# Patient Record
Sex: MALE | Race: ASIAN | NOT HISPANIC OR LATINO
[De-identification: names, ages, dates, MRNs, and addresses within clinical notes are randomized per-mention and may not be internally consistent; named-entity substitution may affect disease eponyms.]

---

## 2018-01-05 ENCOUNTER — APPOINTMENT (OUTPATIENT)
Dept: FAMILY MEDICINE | Facility: CLINIC | Age: 43
End: 2018-01-05
Payer: COMMERCIAL

## 2018-01-05 ENCOUNTER — LABORATORY RESULT (OUTPATIENT)
Age: 43
End: 2018-01-05

## 2018-01-05 VITALS
BODY MASS INDEX: 24.8 KG/M2 | WEIGHT: 140 LBS | RESPIRATION RATE: 12 BRPM | TEMPERATURE: 98.2 F | SYSTOLIC BLOOD PRESSURE: 110 MMHG | DIASTOLIC BLOOD PRESSURE: 78 MMHG | OXYGEN SATURATION: 96 % | HEIGHT: 63 IN | HEART RATE: 83 BPM

## 2018-01-05 DIAGNOSIS — E78.5 HYPERLIPIDEMIA, UNSPECIFIED: ICD-10-CM

## 2018-01-05 DIAGNOSIS — J32.9 CHRONIC SINUSITIS, UNSPECIFIED: ICD-10-CM

## 2018-01-05 DIAGNOSIS — Z87.09 PERSONAL HISTORY OF OTHER DISEASES OF THE RESPIRATORY SYSTEM: ICD-10-CM

## 2018-01-05 DIAGNOSIS — R50.9 FEVER, UNSPECIFIED: ICD-10-CM

## 2018-01-05 DIAGNOSIS — M79.1 MYALGIA: ICD-10-CM

## 2018-01-05 DIAGNOSIS — Z98.890 OTHER SPECIFIED POSTPROCEDURAL STATES: ICD-10-CM

## 2018-01-05 DIAGNOSIS — E55.9 VITAMIN D DEFICIENCY, UNSPECIFIED: ICD-10-CM

## 2018-01-05 PROCEDURE — 87804 INFLUENZA ASSAY W/OPTIC: CPT | Mod: QW

## 2018-01-05 PROCEDURE — 99203 OFFICE O/P NEW LOW 30 MIN: CPT | Mod: 25

## 2018-01-05 RX ORDER — LOSARTAN POTASSIUM 50 MG/1
50 TABLET, FILM COATED ORAL
Qty: 30 | Refills: 0 | Status: COMPLETED | COMMUNITY
Start: 2017-06-29

## 2018-01-05 RX ORDER — ATORVASTATIN CALCIUM 10 MG/1
10 TABLET, FILM COATED ORAL
Refills: 0 | Status: COMPLETED | COMMUNITY

## 2018-01-05 RX ORDER — MYCOPHENOLIC ACID 360 MG/1
360 TABLET, DELAYED RELEASE ORAL
Refills: 0 | Status: COMPLETED | COMMUNITY

## 2018-01-05 RX ORDER — CHROMIUM 200 MCG
1000 TABLET ORAL DAILY
Refills: 0 | Status: ACTIVE | COMMUNITY

## 2018-01-05 RX ORDER — LEVOTHYROXINE SODIUM 0.07 MG/1
75 TABLET ORAL
Qty: 30 | Refills: 0 | Status: COMPLETED | COMMUNITY
Start: 2017-06-29

## 2018-01-05 RX ORDER — PREDNISONE 5 MG/1
5 TABLET ORAL
Refills: 0 | Status: COMPLETED | COMMUNITY

## 2018-01-05 RX ORDER — TACROLIMUS 1 MG/1
1 CAPSULE ORAL
Qty: 120 | Refills: 0 | Status: COMPLETED | COMMUNITY
Start: 2017-06-29

## 2018-01-05 RX ORDER — MYCOPHENILIC ACID 360 MG/1
360 TABLET, DELAYED RELEASE ORAL
Qty: 60 | Refills: 0 | Status: COMPLETED | COMMUNITY
Start: 2017-06-29

## 2018-01-05 RX ORDER — DIBASIC SODIUM PHOSPHATE, MONOBASIC POTASSIUM PHOSPHATE AND MONOBASIC SODIUM PHOSPHATE 852; 155; 130 MG/1; MG/1; MG/1
155-852-130 TABLET ORAL
Qty: 180 | Refills: 0 | Status: COMPLETED | COMMUNITY
Start: 2017-06-29

## 2018-01-06 LAB
ALBUMIN SERPL ELPH-MCNC: 4.5 G/DL
ALP BLD-CCNC: 65 U/L
ALT SERPL-CCNC: 52 U/L
ANION GAP SERPL CALC-SCNC: 12 MMOL/L
AST SERPL-CCNC: 34 U/L
BASOPHILS # BLD AUTO: 0.03 K/UL
BASOPHILS NFR BLD AUTO: 1 %
BILIRUB SERPL-MCNC: 1 MG/DL
BUN SERPL-MCNC: 24 MG/DL
CALCIUM SERPL-MCNC: 10.9 MG/DL
CHLORIDE SERPL-SCNC: 106 MMOL/L
CO2 SERPL-SCNC: 22 MMOL/L
CREAT SERPL-MCNC: 1.17 MG/DL
EOSINOPHIL # BLD AUTO: 0.06 K/UL
EOSINOPHIL NFR BLD AUTO: 2 %
FLUAV SPEC QL CULT: NORMAL
FLUBV AG SPEC QL IA: NORMAL
GLUCOSE SERPL-MCNC: 98 MG/DL
HCT VFR BLD CALC: 48.7 %
HGB BLD-MCNC: 15.5 G/DL
IMM GRANULOCYTES NFR BLD AUTO: 6.8 %
LYMPHOCYTES # BLD AUTO: 0.35 K/UL
LYMPHOCYTES NFR BLD AUTO: 11.8 %
MAN DIFF?: NORMAL
MCHC RBC-ENTMCNC: 29.9 PG
MCHC RBC-ENTMCNC: 31.8 GM/DL
MCV RBC AUTO: 94 FL
MONOCYTES # BLD AUTO: 0.24 K/UL
MONOCYTES NFR BLD AUTO: 8.1 %
NEUTROPHILS # BLD AUTO: 2.08 K/UL
NEUTROPHILS NFR BLD AUTO: 70.3 %
PLATELET # BLD AUTO: 180 K/UL
POTASSIUM SERPL-SCNC: 5.3 MMOL/L
PROT SERPL-MCNC: 7.2 G/DL
RBC # BLD: 5.18 M/UL
RBC # FLD: 13.3 %
SODIUM SERPL-SCNC: 140 MMOL/L
WBC # FLD AUTO: 2.96 K/UL

## 2018-01-09 ENCOUNTER — OUTPATIENT (OUTPATIENT)
Dept: OUTPATIENT SERVICES | Facility: HOSPITAL | Age: 43
LOS: 1 days | Discharge: HOME | End: 2018-01-09

## 2018-01-09 DIAGNOSIS — I15.8 OTHER SECONDARY HYPERTENSION: ICD-10-CM

## 2018-01-09 DIAGNOSIS — R80.9 PROTEINURIA, UNSPECIFIED: ICD-10-CM

## 2018-01-09 DIAGNOSIS — N25.81 SECONDARY HYPERPARATHYROIDISM OF RENAL ORIGIN: ICD-10-CM

## 2018-01-09 DIAGNOSIS — Z94.0 KIDNEY TRANSPLANT STATUS: ICD-10-CM

## 2018-01-09 DIAGNOSIS — Z98.85 TRANSPLANTED ORGAN REMOVAL STATUS: ICD-10-CM

## 2018-01-09 DIAGNOSIS — E78.2 MIXED HYPERLIPIDEMIA: ICD-10-CM

## 2018-04-20 ENCOUNTER — OUTPATIENT (OUTPATIENT)
Dept: OUTPATIENT SERVICES | Facility: HOSPITAL | Age: 43
LOS: 1 days | Discharge: HOME | End: 2018-04-20

## 2018-04-20 DIAGNOSIS — N18.2 CHRONIC KIDNEY DISEASE, STAGE 2 (MILD): ICD-10-CM

## 2018-04-20 DIAGNOSIS — E55.9 VITAMIN D DEFICIENCY, UNSPECIFIED: ICD-10-CM

## 2018-04-20 DIAGNOSIS — N25.81 SECONDARY HYPERPARATHYROIDISM OF RENAL ORIGIN: ICD-10-CM

## 2018-04-20 DIAGNOSIS — Z94.0 KIDNEY TRANSPLANT STATUS: ICD-10-CM

## 2018-04-20 DIAGNOSIS — E03.9 HYPOTHYROIDISM, UNSPECIFIED: ICD-10-CM

## 2018-04-23 ENCOUNTER — EMERGENCY (EMERGENCY)
Facility: HOSPITAL | Age: 43
LOS: 0 days | Discharge: HOME | End: 2018-04-23
Attending: EMERGENCY MEDICINE | Admitting: EMERGENCY MEDICINE
Payer: OTHER MISCELLANEOUS

## 2018-04-23 VITALS
DIASTOLIC BLOOD PRESSURE: 98 MMHG | HEART RATE: 101 BPM | RESPIRATION RATE: 18 BRPM | OXYGEN SATURATION: 98 % | SYSTOLIC BLOOD PRESSURE: 142 MMHG | TEMPERATURE: 98 F

## 2018-04-23 VITALS — WEIGHT: 149.91 LBS | HEIGHT: 64 IN

## 2018-04-23 DIAGNOSIS — S61.231A PUNCTURE WOUND WITHOUT FOREIGN BODY OF LEFT INDEX FINGER WITHOUT DAMAGE TO NAIL, INITIAL ENCOUNTER: ICD-10-CM
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
## 2018-04-23 PROCEDURE — 99283 EMERGENCY DEPT VISIT LOW MDM: CPT

## 2018-04-23 RX ORDER — TETANUS TOXOID, REDUCED DIPHTHERIA TOXOID AND ACELLULAR PERTUSSIS VACCINE, ADSORBED 5; 2.5; 8; 8; 2.5 [IU]/.5ML; [IU]/.5ML; UG/.5ML; UG/.5ML; UG/.5ML
0.5 SUSPENSION INTRAMUSCULAR ONCE
Qty: 0 | Refills: 0 | Status: COMPLETED | OUTPATIENT
Start: 2018-04-23 | End: 2018-04-23

## 2018-04-23 RX ADMIN — TETANUS TOXOID, REDUCED DIPHTHERIA TOXOID AND ACELLULAR PERTUSSIS VACCINE, ADSORBED 0.5 MILLILITER(S): 5; 2.5; 8; 8; 2.5 SUSPENSION INTRAMUSCULAR at 14:01

## 2018-04-23 NOTE — ED STATDOCS - OBJECTIVE STATEMENT
44 y/o M with a PMHx of kidney transplant presents to the ED c/o needle stick that occurred earlier this morning while upstairs moving pt who was receiving spinal procedure. Pt states that the needle was in pt back, stuck him through his glove, sent down by . Pt currently calm, able to provide adequate hx and denies fever, cough, chills, abd pain, NVD or any other acute c/o at this time. Pt requesting exposure paperwork to be filled out.

## 2018-04-23 NOTE — ED STATDOCS - PROGRESS NOTE DETAILS
Patient seen and evaluated.  BW drawn and sent blinded to lab.  Reviewed risks of raimundo blood borne disease, patient declines PEP at this time as he would prefer to wait for BW from source patient.  Packet filled out, patient reports he will do the online incident report.  -Justo Mederos PA-C

## 2018-04-23 NOTE — ED ADULT NURSE NOTE - OBJECTIVE STATEMENT
pt is working in  OR needle stick to left pointing  finger while moving a pt. pt got stock from a spinal  monitor needle washed with soup and water prior to arrival.

## 2018-04-23 NOTE — ED ADULT TRIAGE NOTE - CHIEF COMPLAINT QUOTE
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 expressed blood from area no active bleeding noted.

## 2018-04-23 NOTE — ED STATDOCS - ATTENDING CONTRIBUTION TO CARE
I, Dalia Ward MD,  performed the initial face to face bedside interview with this patient regarding history of present illness, review of symptoms and relevant past medical, social and family history.  I completed an independent physical examination.  I was the initial provider who evaluated this patient. I have signed out the follow up of any pending tests (i.e. labs, radiological studies) to the ACP.  I have communicated the patient’s plan of care and disposition with the ACP.  The history, relevant review of systems, past medical and surgical history, medical decision making, and physical examination was documented by the scribe in my presence and I attest to the accuracy of the documentation.

## 2018-04-23 NOTE — ED STATDOCS - MEDICAL DECISION MAKING DETAILS
42 y/o M needle stick injury, sent down to receive blood work, prophylactic information discussed with pt who will withhold tx at this time. 42 y/o M needle stick injury, sent down to receive blood work, prophylactic information discussed with pt.  R/b/A d/w pt  who will withhold tx at this time, source pt will be tested.

## 2018-07-25 ENCOUNTER — OUTPATIENT (OUTPATIENT)
Dept: OUTPATIENT SERVICES | Facility: HOSPITAL | Age: 43
LOS: 1 days | Discharge: HOME | End: 2018-07-25

## 2018-07-25 DIAGNOSIS — N25.81 SECONDARY HYPERPARATHYROIDISM OF RENAL ORIGIN: ICD-10-CM

## 2018-07-25 DIAGNOSIS — E78.2 MIXED HYPERLIPIDEMIA: ICD-10-CM

## 2018-07-25 DIAGNOSIS — N18.2 CHRONIC KIDNEY DISEASE, STAGE 2 (MILD): ICD-10-CM

## 2018-07-25 DIAGNOSIS — E55.9 VITAMIN D DEFICIENCY, UNSPECIFIED: ICD-10-CM

## 2018-07-25 DIAGNOSIS — Z94.0 KIDNEY TRANSPLANT STATUS: ICD-10-CM

## 2018-07-25 DIAGNOSIS — E03.9 HYPOTHYROIDISM, UNSPECIFIED: ICD-10-CM

## 2018-09-05 ENCOUNTER — OUTPATIENT (OUTPATIENT)
Dept: OUTPATIENT SERVICES | Facility: HOSPITAL | Age: 43
LOS: 1 days | Discharge: HOME | End: 2018-09-05

## 2018-09-05 DIAGNOSIS — T86.10 UNSPECIFIED COMPLICATION OF KIDNEY TRANSPLANT: ICD-10-CM

## 2019-01-11 ENCOUNTER — OUTPATIENT (OUTPATIENT)
Dept: OUTPATIENT SERVICES | Facility: HOSPITAL | Age: 44
LOS: 1 days | Discharge: HOME | End: 2019-01-11

## 2019-01-11 DIAGNOSIS — T86.19 OTHER COMPLICATION OF KIDNEY TRANSPLANT: ICD-10-CM

## 2019-05-27 ENCOUNTER — FORM ENCOUNTER (OUTPATIENT)
Age: 44
End: 2019-05-27

## 2019-05-28 ENCOUNTER — APPOINTMENT (OUTPATIENT)
Dept: NEUROSURGERY | Facility: CLINIC | Age: 44
End: 2019-05-28
Payer: COMMERCIAL

## 2019-05-28 ENCOUNTER — APPOINTMENT (OUTPATIENT)
Dept: RADIOLOGY | Facility: CLINIC | Age: 44
End: 2019-05-28
Payer: COMMERCIAL

## 2019-05-28 ENCOUNTER — OUTPATIENT (OUTPATIENT)
Dept: OUTPATIENT SERVICES | Facility: HOSPITAL | Age: 44
LOS: 1 days | End: 2019-05-28
Payer: COMMERCIAL

## 2019-05-28 VITALS
BODY MASS INDEX: 24.98 KG/M2 | SYSTOLIC BLOOD PRESSURE: 120 MMHG | RESPIRATION RATE: 13 BRPM | OXYGEN SATURATION: 94 % | WEIGHT: 141 LBS | TEMPERATURE: 98.2 F | HEART RATE: 93 BPM | HEIGHT: 63 IN | DIASTOLIC BLOOD PRESSURE: 77 MMHG

## 2019-05-28 DIAGNOSIS — M79.605 LOW BACK PAIN: ICD-10-CM

## 2019-05-28 DIAGNOSIS — M54.5 LOW BACK PAIN: ICD-10-CM

## 2019-05-28 DIAGNOSIS — Z00.8 ENCOUNTER FOR OTHER GENERAL EXAMINATION: ICD-10-CM

## 2019-05-28 PROCEDURE — 73502 X-RAY EXAM HIP UNI 2-3 VIEWS: CPT

## 2019-05-28 PROCEDURE — 73502 X-RAY EXAM HIP UNI 2-3 VIEWS: CPT | Mod: 26,LT

## 2019-05-28 PROCEDURE — 73564 X-RAY EXAM KNEE 4 OR MORE: CPT | Mod: 26,LT

## 2019-05-28 PROCEDURE — 99203 OFFICE O/P NEW LOW 30 MIN: CPT

## 2019-05-28 PROCEDURE — 73564 X-RAY EXAM KNEE 4 OR MORE: CPT

## 2019-05-28 NOTE — CONSULT LETTER
[Sincerely,] : Sincerely, [Consult Letter:] : I had the pleasure of evaluating your patient, [unfilled]. [Consult Closing:] : Thank you very much for allowing me to participate in the care of this patient.  If you have any questions, please do not hesitate to contact me. [FreeTextEntry2] : ELIDA Reed [FreeTextEntry1] : Mr. Tapia is a pleasant 44-year-old male patient who was seen in our office today in regards to left-sided back and leg pain.\par \par The patient versus 3 month history of increasing low back pain radiating to the knee. The patient does not recall any inciting event or traumatic events. The pain is intermittent and occurs usually after long walks. The patient's pain is rated as 3/10 in severity. Currently, the patient has only taken Tylenol for his pain and has been managed by his primary care physician for this issue. The pain is occasionally associated with a tingling about the knee. The patient notes that his pain is alleviated with sitting and while in bed. The pain is usually down the posterior aspect of his leg to just below the knee.\par \par Patient's past history significant for knee surgery in 2013, and a kidney replacement. The patient also has hypertension. The patient's current medical regimen includes tacrolimus, mycophenolate, losartan, prednisone, levothyroxine, and atorvastatin.\par \par On examination, patient is alert, oriented, and compliant with the exam. He demonstrates 5/5 strength in the upper and lower extremities bilaterally. The patient demonstrate 2+ reflexes at the patellar and Achilles tendons bilaterally. The patient ambulates well. The patient does not have a Lal sign or ankle clonus. Internal and external rotation of the patient's hip does not elicit pain.\par \par I have no new imaging to review today.\par \par Taken together, the patient has a clinical history most consistent with a lumbar radiculopathy. Given that the patient has experienced pain for over 3 months despite conservative management therapy under the guidance of a medical professional, I recommended a lumbar MRI scan to rule out structural pathology. I have additionally requested an x-ray of the patient's hip and knee to rule out a chronic degenerative process. I have requested followup with the patient once his images are complete so that we can review them together. [FreeTextEntry3] : \par Guillermo Ward MD, PhD, FRCSC, FAANS\par \par Attending Neurosurgeon\par [facility]\par  of Neurosurgery\par [facility]\par \par [address]\[place] [address]\par \par Office: [phone]\par Fax: [phone]\par Email: [email or web address]\par

## 2019-05-28 NOTE — REASON FOR VISIT
[New Patient Visit] : a new patient visit [FreeTextEntry1] : Left sided pain from lower back radiating to knee that started about 3 months ago

## 2019-05-31 ENCOUNTER — FORM ENCOUNTER (OUTPATIENT)
Age: 44
End: 2019-05-31

## 2019-06-01 ENCOUNTER — OUTPATIENT (OUTPATIENT)
Dept: OUTPATIENT SERVICES | Facility: HOSPITAL | Age: 44
LOS: 1 days | End: 2019-06-01
Payer: COMMERCIAL

## 2019-06-01 ENCOUNTER — APPOINTMENT (OUTPATIENT)
Dept: MRI IMAGING | Facility: CLINIC | Age: 44
End: 2019-06-01
Payer: COMMERCIAL

## 2019-06-01 DIAGNOSIS — M54.5 LOW BACK PAIN: ICD-10-CM

## 2019-06-01 PROCEDURE — 72148 MRI LUMBAR SPINE W/O DYE: CPT

## 2019-06-01 PROCEDURE — 72148 MRI LUMBAR SPINE W/O DYE: CPT | Mod: 26

## 2020-03-02 ENCOUNTER — APPOINTMENT (OUTPATIENT)
Dept: ORTHOPEDIC SURGERY | Facility: CLINIC | Age: 45
End: 2020-03-02
Payer: COMMERCIAL

## 2020-03-02 VITALS
BODY MASS INDEX: 24.8 KG/M2 | WEIGHT: 140 LBS | HEART RATE: 118 BPM | HEIGHT: 63 IN | DIASTOLIC BLOOD PRESSURE: 88 MMHG | SYSTOLIC BLOOD PRESSURE: 132 MMHG | TEMPERATURE: 98.1 F

## 2020-03-02 PROCEDURE — 99203 OFFICE O/P NEW LOW 30 MIN: CPT

## 2020-03-02 PROCEDURE — 73560 X-RAY EXAM OF KNEE 1 OR 2: CPT | Mod: LT

## 2020-03-05 NOTE — PHYSICAL EXAM
[de-identified] : Right knee:\par Knee: Range of Motion in Degrees	\par 	                  Claimant:	Normal:	\par Flexion Active	  135 	                135-degrees	\par Flexion Passive	  135	                135-degrees	\par Extension Active	  0-5	                0-5-degrees	\par Extension Passive	  0-5	                0-5-degrees	\par \par No weakness to flexion/extension.  No evidence of instability in the AP plane or varus or valgus stress.  Negative  Lachman.  Negative pivot shift.  Negative anterior drawer test.  Negative posterior drawer test.  Negative Karina.  Negative Apley grind.  No medial or lateral joint line tenderness.  No tenderness over the medial and lateral facet of the patella.  No patellofemoral crepitations.  No lateral tilting patella.  No patellar apprehension.  No crepitation in the medial and lateral femoral condyle.  No proximal or distal swelling, edema or tenderness.  No gross motor or sensory deficits.  No intra-articular swelling.  2+ DP and PT pulses. No varus or valgus malalignment.  Skin is intact.  No rashes, scars or lesions.  \par  \par Left knee:\par Knee: Range of Motion in Degrees	\par 	                  Claimant:	Normal:	\par Flexion Active	  135 	                135-degrees	\par Flexion Passive	  135	                135-degrees	\par Extension Active	  0-5	                0-5-degrees	\par Extension Passive	  0-5	                0-5-degrees	\par \par No weakness to flexion/extension.  No evidence of instability in the AP plane or varus or valgus stress.  Negative  Lachman.  Negative pivot shift.  Negative anterior drawer test.  Negative posterior drawer test.  Positive posterior medial joint line tenderness.  Negative lateral joint line tenderness.  Positive Karina.  Positive Apley grind.  No tenderness over the medial and lateral facet of the patella.  No patellofemoral crepitations.  No lateral tilting patella.  No patella apprehension.  No crepitation in the medial and lateral femoral condyle.  No proximal or distal swelling, edema or tenderness.  No gross motor or sensory deficits.  Mild intra-articular swelling.  2+ DP and PT pulses.  No varus or valgus malalignment.  Skin is intact.  No rashes, scars or lesions.   \par  \par  [de-identified] : Gait: Slightly antalgic to antalgic gait.  Station: Normal  [de-identified] : Appearance: Well-developed, well-nourished male  in no acute distress.  [de-identified] : Radiographs, one to two views of the left knee, show a question of an OCD lesion medial femoral condyle.

## 2020-03-05 NOTE — HISTORY OF PRESENT ILLNESS
[4] : a current pain level of 4/10 [de-identified] : Teddy comes in today for his left knee.  He has been having some increasing complaints of pain, especially after prolonged standing, walking and with bending his knee.  The patient states the onset/injury occurred on 2/1/20.  This injury is not work related or due to an automobile accident.  The patient states the pain is intermittent.   The patient describes it as "swelling".  The patient states rest makes the symptoms better while walking, standing and bending make the symptoms worse. [] : No

## 2020-03-05 NOTE — ADDENDUM
[FreeTextEntry1] : This note was written by Bisi Parisi on 03/03/2020 acting as scribe for Omid Hidalgo III, MD

## 2020-03-05 NOTE — CONSULT LETTER
[Dear  ___] : Dear  [unfilled], [Consult Letter:] : I had the pleasure of evaluating your patient, [unfilled]. [Please see my note below.] : Please see my note below. [Sincerely,] : Sincerely, [Consult Closing:] : Thank you very much for allowing me to participate in the care of this patient.  If you have any questions, please do not hesitate to contact me. [FreeTextEntry3] : Omid Hidalgo III, MD \par ALESIA/segundo

## 2020-03-05 NOTE — DISCUSSION/SUMMARY
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  At this time I recommend that he undergo an MRI.  He will be reassessed after the MRI.

## 2020-03-06 ENCOUNTER — FORM ENCOUNTER (OUTPATIENT)
Age: 45
End: 2020-03-06

## 2020-03-07 ENCOUNTER — OUTPATIENT (OUTPATIENT)
Dept: OUTPATIENT SERVICES | Facility: HOSPITAL | Age: 45
LOS: 1 days | End: 2020-03-07
Payer: COMMERCIAL

## 2020-03-07 ENCOUNTER — APPOINTMENT (OUTPATIENT)
Dept: MRI IMAGING | Facility: CLINIC | Age: 45
End: 2020-03-07
Payer: COMMERCIAL

## 2020-03-07 DIAGNOSIS — M25.562 PAIN IN LEFT KNEE: ICD-10-CM

## 2020-03-07 PROCEDURE — 73721 MRI JNT OF LWR EXTRE W/O DYE: CPT

## 2020-03-07 PROCEDURE — 73721 MRI JNT OF LWR EXTRE W/O DYE: CPT | Mod: 26,LT

## 2020-04-25 ENCOUNTER — MESSAGE (OUTPATIENT)
Age: 45
End: 2020-04-25

## 2020-05-02 LAB
SARS-COV-2 IGG SERPL IA-ACNC: 0.1 INDEX
SARS-COV-2 IGG SERPL QL IA: NEGATIVE

## 2020-05-18 ENCOUNTER — OUTPATIENT (OUTPATIENT)
Dept: OUTPATIENT SERVICES | Facility: HOSPITAL | Age: 45
LOS: 1 days | End: 2020-05-18
Payer: COMMERCIAL

## 2020-05-18 DIAGNOSIS — I10 ESSENTIAL (PRIMARY) HYPERTENSION: ICD-10-CM

## 2020-05-18 DIAGNOSIS — Z48.22 ENCOUNTER FOR AFTERCARE FOLLOWING KIDNEY TRANSPLANT: ICD-10-CM

## 2020-05-18 PROCEDURE — 81001 URINALYSIS AUTO W/SCOPE: CPT

## 2020-05-18 PROCEDURE — 36415 COLL VENOUS BLD VENIPUNCTURE: CPT

## 2020-05-18 PROCEDURE — 82043 UR ALBUMIN QUANTITATIVE: CPT

## 2020-05-18 PROCEDURE — 80197 ASSAY OF TACROLIMUS: CPT

## 2020-05-18 PROCEDURE — 87798 DETECT AGENT NOS DNA AMP: CPT

## 2020-05-18 PROCEDURE — 85025 COMPLETE CBC W/AUTO DIFF WBC: CPT

## 2020-05-18 PROCEDURE — 80053 COMPREHEN METABOLIC PANEL: CPT

## 2020-05-19 DIAGNOSIS — Z48.22 ENCOUNTER FOR AFTERCARE FOLLOWING KIDNEY TRANSPLANT: ICD-10-CM

## 2020-06-09 ENCOUNTER — OUTPATIENT (OUTPATIENT)
Dept: OUTPATIENT SERVICES | Facility: HOSPITAL | Age: 45
LOS: 1 days | End: 2020-06-09
Payer: COMMERCIAL

## 2020-06-09 DIAGNOSIS — E83.30 DISORDER OF PHOSPHORUS METABOLISM, UNSPECIFIED: ICD-10-CM

## 2020-06-09 DIAGNOSIS — N25.81 SECONDARY HYPERPARATHYROIDISM OF RENAL ORIGIN: ICD-10-CM

## 2020-06-09 DIAGNOSIS — N18.2 CHRONIC KIDNEY DISEASE, STAGE 2 (MILD): ICD-10-CM

## 2020-06-09 DIAGNOSIS — Z94.0 KIDNEY TRANSPLANT STATUS: ICD-10-CM

## 2020-06-09 DIAGNOSIS — E78.5 HYPERLIPIDEMIA, UNSPECIFIED: ICD-10-CM

## 2020-06-09 DIAGNOSIS — E55.9 VITAMIN D DEFICIENCY, UNSPECIFIED: ICD-10-CM

## 2020-06-09 DIAGNOSIS — I10 ESSENTIAL (PRIMARY) HYPERTENSION: ICD-10-CM

## 2020-06-09 DIAGNOSIS — R80.9 PROTEINURIA, UNSPECIFIED: ICD-10-CM

## 2020-06-09 PROCEDURE — 83970 ASSAY OF PARATHORMONE: CPT

## 2020-06-09 PROCEDURE — 36415 COLL VENOUS BLD VENIPUNCTURE: CPT

## 2020-06-09 PROCEDURE — 84100 ASSAY OF PHOSPHORUS: CPT

## 2020-06-09 PROCEDURE — 82310 ASSAY OF CALCIUM: CPT

## 2020-06-09 PROCEDURE — 80197 ASSAY OF TACROLIMUS: CPT

## 2020-06-09 PROCEDURE — 85025 COMPLETE CBC W/AUTO DIFF WBC: CPT

## 2020-06-09 PROCEDURE — 80061 LIPID PANEL: CPT

## 2020-06-09 PROCEDURE — 84550 ASSAY OF BLOOD/URIC ACID: CPT

## 2020-06-09 PROCEDURE — 83735 ASSAY OF MAGNESIUM: CPT

## 2020-06-09 PROCEDURE — 84439 ASSAY OF FREE THYROXINE: CPT

## 2020-06-09 PROCEDURE — 80053 COMPREHEN METABOLIC PANEL: CPT

## 2020-06-09 PROCEDURE — 84443 ASSAY THYROID STIM HORMONE: CPT

## 2020-06-09 PROCEDURE — 82043 UR ALBUMIN QUANTITATIVE: CPT

## 2020-06-09 PROCEDURE — 84480 ASSAY TRIIODOTHYRONINE (T3): CPT

## 2020-06-09 PROCEDURE — 82306 VITAMIN D 25 HYDROXY: CPT

## 2020-06-10 DIAGNOSIS — N25.81 SECONDARY HYPERPARATHYROIDISM OF RENAL ORIGIN: ICD-10-CM

## 2020-06-10 DIAGNOSIS — I10 ESSENTIAL (PRIMARY) HYPERTENSION: ICD-10-CM

## 2020-06-10 DIAGNOSIS — R80.9 PROTEINURIA, UNSPECIFIED: ICD-10-CM

## 2020-06-10 DIAGNOSIS — Z94.0 KIDNEY TRANSPLANT STATUS: ICD-10-CM

## 2020-06-10 DIAGNOSIS — E55.9 VITAMIN D DEFICIENCY, UNSPECIFIED: ICD-10-CM

## 2020-06-10 DIAGNOSIS — N18.2 CHRONIC KIDNEY DISEASE, STAGE 2 (MILD): ICD-10-CM

## 2020-06-10 DIAGNOSIS — E78.5 HYPERLIPIDEMIA, UNSPECIFIED: ICD-10-CM

## 2020-06-10 DIAGNOSIS — E83.30 DISORDER OF PHOSPHORUS METABOLISM, UNSPECIFIED: ICD-10-CM

## 2020-09-03 DIAGNOSIS — S86.911A STRAIN OF UNSPECIFIED MUSCLE(S) AND TENDON(S) AT LOWER LEG LEVEL, RIGHT LEG, INITIAL ENCOUNTER: ICD-10-CM

## 2020-10-08 ENCOUNTER — OUTPATIENT (OUTPATIENT)
Dept: OUTPATIENT SERVICES | Facility: HOSPITAL | Age: 45
LOS: 1 days | End: 2020-10-08
Payer: COMMERCIAL

## 2020-10-08 DIAGNOSIS — R80.9 PROTEINURIA, UNSPECIFIED: ICD-10-CM

## 2020-10-08 DIAGNOSIS — E83.30 DISORDER OF PHOSPHORUS METABOLISM, UNSPECIFIED: ICD-10-CM

## 2020-10-08 DIAGNOSIS — E78.5 HYPERLIPIDEMIA, UNSPECIFIED: ICD-10-CM

## 2020-10-08 DIAGNOSIS — R80.8 OTHER PROTEINURIA: ICD-10-CM

## 2020-10-08 DIAGNOSIS — N18.30 CHRONIC KIDNEY DISEASE, STAGE 3 UNSPECIFIED: ICD-10-CM

## 2020-10-08 DIAGNOSIS — Z94.0 KIDNEY TRANSPLANT STATUS: ICD-10-CM

## 2020-10-08 DIAGNOSIS — Z48.22 ENCOUNTER FOR AFTERCARE FOLLOWING KIDNEY TRANSPLANT: ICD-10-CM

## 2020-10-08 DIAGNOSIS — Z48.298 ENCOUNTER FOR AFTERCARE FOLLOWING OTHER ORGAN TRANSPLANT: ICD-10-CM

## 2020-10-08 DIAGNOSIS — E55.9 VITAMIN D DEFICIENCY, UNSPECIFIED: ICD-10-CM

## 2020-10-08 DIAGNOSIS — N25.81 SECONDARY HYPERPARATHYROIDISM OF RENAL ORIGIN: ICD-10-CM

## 2020-10-08 DIAGNOSIS — T86.10 UNSPECIFIED COMPLICATION OF KIDNEY TRANSPLANT: ICD-10-CM

## 2020-10-08 DIAGNOSIS — N18.2 CHRONIC KIDNEY DISEASE, STAGE 2 (MILD): ICD-10-CM

## 2020-10-08 DIAGNOSIS — I10 ESSENTIAL (PRIMARY) HYPERTENSION: ICD-10-CM

## 2020-10-08 PROCEDURE — 84443 ASSAY THYROID STIM HORMONE: CPT

## 2020-10-08 PROCEDURE — 85025 COMPLETE CBC W/AUTO DIFF WBC: CPT

## 2020-10-08 PROCEDURE — 81001 URINALYSIS AUTO W/SCOPE: CPT

## 2020-10-08 PROCEDURE — 87086 URINE CULTURE/COLONY COUNT: CPT

## 2020-10-08 PROCEDURE — 84156 ASSAY OF PROTEIN URINE: CPT

## 2020-10-08 PROCEDURE — 80197 ASSAY OF TACROLIMUS: CPT

## 2020-10-08 PROCEDURE — 36415 COLL VENOUS BLD VENIPUNCTURE: CPT

## 2020-10-08 PROCEDURE — 84439 ASSAY OF FREE THYROXINE: CPT

## 2020-10-08 PROCEDURE — 82043 UR ALBUMIN QUANTITATIVE: CPT

## 2020-10-08 PROCEDURE — 82306 VITAMIN D 25 HYDROXY: CPT

## 2020-10-08 PROCEDURE — 80053 COMPREHEN METABOLIC PANEL: CPT

## 2020-10-08 PROCEDURE — 80180 DRUG SCRN QUAN MYCOPHENOLATE: CPT

## 2020-10-08 PROCEDURE — 83970 ASSAY OF PARATHORMONE: CPT

## 2020-10-08 PROCEDURE — 82570 ASSAY OF URINE CREATININE: CPT

## 2020-10-08 PROCEDURE — 84100 ASSAY OF PHOSPHORUS: CPT

## 2020-10-08 PROCEDURE — 84550 ASSAY OF BLOOD/URIC ACID: CPT

## 2020-10-08 PROCEDURE — 82310 ASSAY OF CALCIUM: CPT

## 2020-10-09 DIAGNOSIS — N25.81 SECONDARY HYPERPARATHYROIDISM OF RENAL ORIGIN: ICD-10-CM

## 2020-10-09 DIAGNOSIS — R80.8 OTHER PROTEINURIA: ICD-10-CM

## 2020-10-09 DIAGNOSIS — Z94.0 KIDNEY TRANSPLANT STATUS: ICD-10-CM

## 2020-10-09 DIAGNOSIS — Z48.298 ENCOUNTER FOR AFTERCARE FOLLOWING OTHER ORGAN TRANSPLANT: ICD-10-CM

## 2020-10-09 DIAGNOSIS — N18.2 CHRONIC KIDNEY DISEASE, STAGE 2 (MILD): ICD-10-CM

## 2020-10-09 DIAGNOSIS — T86.10 UNSPECIFIED COMPLICATION OF KIDNEY TRANSPLANT: ICD-10-CM

## 2020-10-09 DIAGNOSIS — E78.5 HYPERLIPIDEMIA, UNSPECIFIED: ICD-10-CM

## 2020-10-09 DIAGNOSIS — R80.9 PROTEINURIA, UNSPECIFIED: ICD-10-CM

## 2020-10-09 DIAGNOSIS — I10 ESSENTIAL (PRIMARY) HYPERTENSION: ICD-10-CM

## 2020-10-09 DIAGNOSIS — E55.9 VITAMIN D DEFICIENCY, UNSPECIFIED: ICD-10-CM

## 2020-10-09 DIAGNOSIS — N18.30 CHRONIC KIDNEY DISEASE, STAGE 3 UNSPECIFIED: ICD-10-CM

## 2020-10-09 DIAGNOSIS — Z48.22 ENCOUNTER FOR AFTERCARE FOLLOWING KIDNEY TRANSPLANT: ICD-10-CM

## 2020-10-09 DIAGNOSIS — E83.30 DISORDER OF PHOSPHORUS METABOLISM, UNSPECIFIED: ICD-10-CM

## 2020-10-25 ENCOUNTER — APPOINTMENT (OUTPATIENT)
Dept: ULTRASOUND IMAGING | Facility: CLINIC | Age: 45
End: 2020-10-25
Payer: COMMERCIAL

## 2020-10-25 ENCOUNTER — OUTPATIENT (OUTPATIENT)
Dept: OUTPATIENT SERVICES | Facility: HOSPITAL | Age: 45
LOS: 1 days | End: 2020-10-25
Payer: COMMERCIAL

## 2020-10-25 DIAGNOSIS — E78.5 HYPERLIPIDEMIA, UNSPECIFIED: ICD-10-CM

## 2020-10-25 DIAGNOSIS — Z94.0 KIDNEY TRANSPLANT STATUS: ICD-10-CM

## 2020-10-25 DIAGNOSIS — N18.2 CHRONIC KIDNEY DISEASE, STAGE 2 (MILD): ICD-10-CM

## 2020-10-25 DIAGNOSIS — I10 ESSENTIAL (PRIMARY) HYPERTENSION: ICD-10-CM

## 2020-10-25 DIAGNOSIS — N25.81 SECONDARY HYPERPARATHYROIDISM OF RENAL ORIGIN: ICD-10-CM

## 2020-10-25 PROCEDURE — 93970 EXTREMITY STUDY: CPT | Mod: 26

## 2020-10-25 PROCEDURE — 76776 US EXAM K TRANSPL W/DOPPLER: CPT

## 2020-10-25 PROCEDURE — 93970 EXTREMITY STUDY: CPT

## 2020-10-25 PROCEDURE — 76776 US EXAM K TRANSPL W/DOPPLER: CPT | Mod: 26,LT

## 2020-10-31 ENCOUNTER — APPOINTMENT (OUTPATIENT)
Dept: RADIOLOGY | Facility: CLINIC | Age: 45
End: 2020-10-31
Payer: COMMERCIAL

## 2020-10-31 ENCOUNTER — OUTPATIENT (OUTPATIENT)
Dept: OUTPATIENT SERVICES | Facility: HOSPITAL | Age: 45
LOS: 1 days | End: 2020-10-31
Payer: COMMERCIAL

## 2020-10-31 DIAGNOSIS — Z00.00 ENCOUNTER FOR GENERAL ADULT MEDICAL EXAMINATION WITHOUT ABNORMAL FINDINGS: ICD-10-CM

## 2020-10-31 PROCEDURE — 77080 DXA BONE DENSITY AXIAL: CPT

## 2020-10-31 PROCEDURE — 77080 DXA BONE DENSITY AXIAL: CPT | Mod: 26

## 2020-12-16 PROBLEM — Z87.09 HISTORY OF SORE THROAT: Status: RESOLVED | Noted: 2018-01-05 | Resolved: 2020-12-16

## 2021-02-26 ENCOUNTER — RESULT REVIEW (OUTPATIENT)
Age: 46
End: 2021-02-26

## 2021-02-26 ENCOUNTER — APPOINTMENT (OUTPATIENT)
Dept: DERMATOLOGY | Facility: CLINIC | Age: 46
End: 2021-02-26
Payer: COMMERCIAL

## 2021-02-26 PROCEDURE — 99072 ADDL SUPL MATRL&STAF TM PHE: CPT

## 2021-02-26 PROCEDURE — 99203 OFFICE O/P NEW LOW 30 MIN: CPT | Mod: 25

## 2021-02-26 PROCEDURE — 11106 INCAL BX SKN SINGLE LES: CPT

## 2021-03-12 ENCOUNTER — APPOINTMENT (OUTPATIENT)
Dept: DERMATOLOGY | Facility: CLINIC | Age: 46
End: 2021-03-12
Payer: COMMERCIAL

## 2021-03-12 PROCEDURE — 99212 OFFICE O/P EST SF 10 MIN: CPT

## 2021-03-12 PROCEDURE — 99072 ADDL SUPL MATRL&STAF TM PHE: CPT

## 2021-10-22 NOTE — ED STATDOCS - NS ED SCRIBE STATEMENT
Attending

## 2022-01-28 ENCOUNTER — NON-APPOINTMENT (OUTPATIENT)
Age: 47
End: 2022-01-28

## 2022-01-28 ENCOUNTER — APPOINTMENT (OUTPATIENT)
Dept: NEPHROLOGY | Facility: CLINIC | Age: 47
End: 2022-01-28
Payer: COMMERCIAL

## 2022-01-28 VITALS
SYSTOLIC BLOOD PRESSURE: 107 MMHG | HEART RATE: 123 BPM | OXYGEN SATURATION: 96 % | HEIGHT: 63 IN | TEMPERATURE: 98 F | DIASTOLIC BLOOD PRESSURE: 79 MMHG | BODY MASS INDEX: 24.61 KG/M2 | WEIGHT: 138.89 LBS

## 2022-01-28 DIAGNOSIS — N18.2 CHRONIC KIDNEY DISEASE, STAGE 2 (MILD): ICD-10-CM

## 2022-01-28 PROCEDURE — 99204 OFFICE O/P NEW MOD 45 MIN: CPT

## 2022-01-28 RX ORDER — AZITHROMYCIN 250 MG/1
250 TABLET, FILM COATED ORAL
Qty: 6 | Refills: 0 | Status: DISCONTINUED | COMMUNITY
Start: 2018-01-05 | End: 2022-01-28

## 2022-01-30 PROBLEM — N18.2 STAGE 2 CHRONIC KIDNEY DISEASE: Status: ACTIVE | Noted: 2022-01-30

## 2022-01-30 NOTE — ASSESSMENT
[FreeTextEntry1] :  47M with PMH of HTN, HLD, Hypothyroidism, biopsy proven FSGS at age 18, s/p failed LDRT from mom ([id number]), s/p DDRT in 2012 sent in for transfer of care from Dr. Trevor Mckeon's office (insurance no longer accepted)\par \par #FSGS at age 18-  s/p failed LDRT from mom ([id number]), s/p DDRT in 2012\par -Well functioning allograft with stable kidney function (last SCr on 11/22/21 was 1.04 with BUN 21 & eGFR 87)\par -Continue Tacro 2mg bid (last trough was 4.9; goal 4-6); continue Mycophenolic acid 360 mg bid, prednisone 5 mg PO daily.\par -Continue losartan 50 mg PO daily (last UP/C ratio was 0.1)\par -Last labs done on 11/22/21 noted. Check labs in 6 months. Ordered for end of March\par \par \par #Hypophosphatemia-\par likely secondary to tertiary hyperparathyroidism which could happen in longstanding CKD patients\par -PTH elevated at 168\par -Phos low at 1.6 \par -Calcium 10.1 wnl\par -25 Vitamin  D level is 42 wnl\par -Pt was asked to continue taking vitamin D 1000 IU & increase phospha from 250 bid  to 500 mg in AM & 250 mg in PM. Also counseled to eat more dairy & nuts in diet (pt avoids all dairy)\par \par \par #HTN- \par -Controlled\par -Continue Losartan 50 mg Po daily\par \par \par Hypothyroidism-\par Continue synthroid 75 mcg qD\par Last TSH wnl\par \par \par #HLD-\par -Continue atorvastatin 10 mg qD\par \par \par \par

## 2022-01-30 NOTE — HISTORY OF PRESENT ILLNESS
[FreeTextEntry1] : HPI:  47M with PMH of insect bite at age 7 after which kidneys started failing. DIagnosed with biopsy proven FSGS (no genetic testing done) at age 18, and was started on HD for 2 months, after which received  LDRT from mother at age 18 in 1993 ([facility]). Pt received plasmapharesis  and had DGF at that time for few days after which the kidney lasted until 2007. Pt was started back on HD in 2007 & received it for 5 years. Pt received DDRT (high risk donor with high CDC, high CIT, high cPRA, high DSA s/p thymo induction) in 2012 at [facility]. \par Pt was under the care of  Dr. Trevor Mckeon recently, now sent to us for transfer of care (insurance no longer accepted). No NSAIDs/ herbal medications/ licorice ingestion/ Anabolic steroids/ energy drinks. One cup caffeine daily.\par   \par \par PMH: FSGS s/p failed LDRT ([id number]), s/p DDRT in 2012, HTN, HLD, Hypothyroidism, \par \par PSxH: renal tx x 2, L knee surgery, L AVF (non functional)\par \par Social hx:  No smoking/ etoh/ illicit drugs\par \par Occupation: Transporter at [employer]\par \par Family hx: Grandfather with ESRD on HD at age 50\par \par Allergies: NKDA\par \par \par Vaccines:  COVID booster in Sep 2021 (Pfizer), Flu vaccine in Sep\par \par ROS: No headaches, blurry vision, dizziness, chest pain, dyspnea, leg swelling, weight gain, cough, fever, rashes, abdominal pain, N/V, anorexia, pruritus, dysuria, change in frequency or amount of urination, hematuria, cola colored/ dark urine. Complains of foamy urine daily but not worse than usual\par \par \par \par

## 2022-01-30 NOTE — PHYSICAL EXAM
[General Appearance - Alert] : alert [General Appearance - In No Acute Distress] : in no acute distress [General Appearance - Well Nourished] : well nourished [General Appearance - Well Developed] : well developed [General Appearance - Well-Appearing] : healthy appearing [Sclera] : the sclera and conjunctiva were normal [PERRL With Normal Accommodation] : pupils were equal in size, round, and reactive to light [Extraocular Movements] : extraocular movements were intact [Outer Ear] : the ears and nose were normal in appearance [Neck Appearance] : the appearance of the neck was normal [Jugular Venous Distention Increased] : there was no jugular-venous distention [] : no respiratory distress [Respiration, Rhythm And Depth] : normal respiratory rhythm and effort [Exaggerated Use Of Accessory Muscles For Inspiration] : no accessory muscle use [Auscultation Breath Sounds / Voice Sounds] : lungs were clear to auscultation bilaterally [Apical Impulse] : the apical impulse was normal [Heart Sounds] : normal S1 and S2 [Heart Sounds Gallop] : no gallops [Murmurs] : no murmurs [Heart Sounds Pericardial Friction Rub] : no pericardial rub [Edema] : there was no peripheral edema [Bowel Sounds] : normal bowel sounds [Abdomen Soft] : soft [Abdomen Tenderness] : non-tender [No CVA Tenderness] : no ~M costovertebral angle tenderness [No Spinal Tenderness] : no spinal tenderness [Abnormal Walk] : normal gait [No Focal Deficits] : no focal deficits [Oriented To Time, Place, And Person] : oriented to person, place, and time [Impaired Insight] : insight and judgment were intact [Affect] : the affect was normal [Mood] : the mood was normal [FreeTextEntry1] : L AVF (non functional), with weak thrill

## 2022-03-10 ENCOUNTER — APPOINTMENT (OUTPATIENT)
Dept: DERMATOLOGY | Facility: CLINIC | Age: 47
End: 2022-03-10
Payer: COMMERCIAL

## 2022-03-10 PROCEDURE — 99214 OFFICE O/P EST MOD 30 MIN: CPT

## 2022-06-13 ENCOUNTER — NON-APPOINTMENT (OUTPATIENT)
Age: 47
End: 2022-06-13

## 2022-06-13 ENCOUNTER — APPOINTMENT (OUTPATIENT)
Dept: NEPHROLOGY | Facility: CLINIC | Age: 47
End: 2022-06-13

## 2022-06-27 ENCOUNTER — APPOINTMENT (OUTPATIENT)
Dept: NEPHROLOGY | Facility: CLINIC | Age: 47
End: 2022-06-27

## 2022-09-13 ENCOUNTER — APPOINTMENT (OUTPATIENT)
Dept: NEPHROLOGY | Facility: CLINIC | Age: 47
End: 2022-09-13

## 2022-11-18 ENCOUNTER — OUTPATIENT (OUTPATIENT)
Dept: OUTPATIENT SERVICES | Facility: HOSPITAL | Age: 47
LOS: 1 days | End: 2022-11-18
Payer: COMMERCIAL

## 2022-11-18 DIAGNOSIS — I82.401 ACUTE EMBOLISM AND THROMBOSIS OF UNSPECIFIED DEEP VEINS OF RIGHT LOWER EXTREMITY: ICD-10-CM

## 2022-11-18 PROCEDURE — 93971 EXTREMITY STUDY: CPT | Mod: RT

## 2022-11-18 PROCEDURE — 93971 EXTREMITY STUDY: CPT | Mod: 26,RT

## 2022-11-19 DIAGNOSIS — I82.401 ACUTE EMBOLISM AND THROMBOSIS OF UNSPECIFIED DEEP VEINS OF RIGHT LOWER EXTREMITY: ICD-10-CM

## 2022-12-12 ENCOUNTER — APPOINTMENT (OUTPATIENT)
Dept: ORTHOPEDIC SURGERY | Facility: CLINIC | Age: 47
End: 2022-12-12

## 2022-12-12 VITALS
BODY MASS INDEX: 24.45 KG/M2 | HEART RATE: 100 BPM | SYSTOLIC BLOOD PRESSURE: 123 MMHG | HEIGHT: 63 IN | DIASTOLIC BLOOD PRESSURE: 84 MMHG | WEIGHT: 138 LBS

## 2022-12-12 DIAGNOSIS — M13.861: ICD-10-CM

## 2022-12-12 PROCEDURE — 73560 X-RAY EXAM OF KNEE 1 OR 2: CPT | Mod: RT

## 2022-12-12 PROCEDURE — 73502 X-RAY EXAM HIP UNI 2-3 VIEWS: CPT | Mod: RT

## 2022-12-12 PROCEDURE — 99213 OFFICE O/P EST LOW 20 MIN: CPT

## 2022-12-13 PROBLEM — M13.861 OTHER SPECIFIED ARTHRITIS, RIGHT KNEE: Status: ACTIVE | Noted: 2022-12-13

## 2022-12-15 ENCOUNTER — OUTPATIENT (OUTPATIENT)
Dept: OUTPATIENT SERVICES | Facility: HOSPITAL | Age: 47
LOS: 1 days | End: 2022-12-15
Payer: COMMERCIAL

## 2022-12-15 ENCOUNTER — APPOINTMENT (OUTPATIENT)
Dept: MRI IMAGING | Facility: CLINIC | Age: 47
End: 2022-12-15

## 2022-12-15 DIAGNOSIS — M13.861 OTHER SPECIFIED ARTHRITIS, RIGHT KNEE: ICD-10-CM

## 2022-12-15 DIAGNOSIS — M76.819: ICD-10-CM

## 2022-12-15 PROCEDURE — 73721 MRI JNT OF LWR EXTRE W/O DYE: CPT

## 2022-12-15 PROCEDURE — 73721 MRI JNT OF LWR EXTRE W/O DYE: CPT | Mod: 26,RT

## 2022-12-15 NOTE — DISCUSSION/SUMMARY
[de-identified] : The patient presents with 1.) trochanteric bursitis, right hip and 2.) proximal tib/fib arthrosis, right knee.  At this time, because of the severity of his complaints and failure for him to be relieved conservatively, I am going to recommend an MRI of the knee.  For the hip, he has been instructed on home therapeutic modalities

## 2022-12-15 NOTE — ADDENDUM
[FreeTextEntry1] : This note was written by Bisi Parisi on 12/15/2022 acting as scribe for Omid Hidalgo III, MD

## 2022-12-15 NOTE — HISTORY OF PRESENT ILLNESS
[de-identified] : Teddy comes in today with complaints in and around his right hip, but more so around his right knee.  He notes it is worse at night before he goes to bed.  He has pain, swelling and difficulty moving his knee.  But he points below, in the region of the proximal tib/fib as the source of his pain.  The patient states the onset/injury occurred on 9/28/22.  This injury is not work related or due to an automobile accident.  The patient states the pain is localized.  The patient describes the pain as achy and throbbing.  The patient states rest make the symptoms better while walking makes the symptoms worse.  [6] : a current pain level of 6/10 [7] : the ailment interference is 7/10 [] : No

## 2022-12-15 NOTE — PHYSICAL EXAM
[de-identified] : Left hip:\par Hip: Range of Motion in Degrees:\par 	                                 Claimant:	   Normal:	\par Flexion (Active) 	                 120 	   120-degrees	\par Flexion (Passive)	                 120	   120-degrees	\par Extension (Active)	                 -30	   -30-degrees	\par Extension (Passive)	 -30	   -30-degrees	\par Abduction (Active)	                 45-50	   47-98-[id number]	\par Abduction (Passive)	 45-50	   22-87-vjnodwl	\par Adduction (Active)  	 20-30	   05-62-ppbnqiu	\par Adduction (Passive)	 20-30	   79-23-dpxhesp	\par Internal Rotation (Active) 	 35	   35-degrees	\par Internal Rotation (Passive)	 35	   35-degrees	\par External Rotation (Active)	 45	   45-degrees	\par External Rotation (Passive)	 45	   45-degrees	\par \par No tenderness with internal or external rotation or axial load.  No tenderness to palpation over the greater trochanter.  Negative Trendelenburg.  No tenderness with resisted abduction.  No weakness to flexion, extension, abduction or adduction.  No evidence of instability.  No motor or sensory deficits.  2+ DP and PT pulses.  Skin is intact.  No scars, rashes or lesions.  \par  \par Right hip:\par Hip: Range of Motion in Degrees:\par 	                                 Claimant:	          Normal:	\par Flexion (Active) 	                 120 	          120-degrees	\par Flexion (Passive)	                 120	          120-degrees	\par Extension (Active)	                 -30	          -30-degrees	\par Extension (Passive)	 -30	          -30-degrees	\par Abduction (Active)	                45-50	          80-19-jivujho	\par Abduction (Passive)	45-50	          52-14-rlvnuoe	\par Adduction (Active)                	20-30	          87-43-mtjtoya	\par Adduction (Passive)	20-30	          09-15-ilxztfu	\par Internal Rotation (Active) 	35	          35-degrees	\par Internal Rotation (Passive)	35	          35-degrees	\par External Rotation (Active)	45	          45-degrees	\par External Rotation (Passive)	45	          45-degrees	\par \par No tenderness with internal or external rotation or axial load.  Point tenderness over the greater trochanter with pain with resisted abduction.  Negative Trendelenburg.  No weakness to flexion, extension, abduction or adduction.  No evidence of instability.  No motor or sensory deficits.  2+ DP and PT pulses.  Skin is intact.  No scars, rashes or lesions.  \par  \par Right knee:\par Knee: Range of Motion in Degrees	\par 	                  Claimant:	Normal:	\par Flexion Active	  135 	                135-degrees	\par Flexion Passive	  135	                135-degrees	\par Extension Active	  0-5	                0-5-degrees	\par Extension Passive	  0-5	                0-5-degrees	\par \par Tenderness in the proximal tibialis anterior in the region of the proximal tib/fib joint with no instability appreciated.  No weakness to flexion/extension.  Negative  Lachman.  Negative pivot shift.  Negative anterior drawer test.  Negative posterior drawer test.  Negative Karina.  Negative Apley grind.  No patellofemoral crepitations.  No lateral tilting patella.  No patellar apprehension.  No crepitation in the medial and lateral femoral condyle.  No proximal or distal swelling or edema.  No gross motor or sensory deficits.  No intra-articular swelling.  2+ DP and PT pulses. No varus or valgus malalignment.  Skin is intact.  No rashes, scars or lesions.  \par  \par \par  [de-identified] : Gait: Slightly antalgic to antalgic.  Station: Normal  [de-identified] : Appearance: Well-developed, well-nourished male  in no acute distress.  [de-identified] : Radiographs taken in the office today, one to two views of the right knee, show no obvious osseous abnormalities. \par \par Radiographs taken in the office today, two to three views of the right hip, show no obvious osseous abnormalities other than evidence of his kidney transplant.

## 2022-12-19 ENCOUNTER — APPOINTMENT (OUTPATIENT)
Dept: ORTHOPEDIC SURGERY | Facility: CLINIC | Age: 47
End: 2022-12-19
Payer: COMMERCIAL

## 2022-12-19 DIAGNOSIS — M76.819 ANTERIOR TIBIAL SYNDROME, UNSPECIFIED LEG: ICD-10-CM

## 2022-12-19 PROCEDURE — 99441: CPT

## 2022-12-23 ENCOUNTER — APPOINTMENT (OUTPATIENT)
Dept: NEPHROLOGY | Facility: CLINIC | Age: 47
End: 2022-12-23

## 2022-12-23 VITALS
OXYGEN SATURATION: 97 % | WEIGHT: 135 LBS | HEART RATE: 128 BPM | SYSTOLIC BLOOD PRESSURE: 123 MMHG | BODY MASS INDEX: 23.92 KG/M2 | TEMPERATURE: 97.8 F | DIASTOLIC BLOOD PRESSURE: 86 MMHG | HEIGHT: 63 IN

## 2022-12-23 PROCEDURE — 99215 OFFICE O/P EST HI 40 MIN: CPT

## 2022-12-23 NOTE — PHYSICAL EXAM
[General Appearance - Alert] : alert [General Appearance - In No Acute Distress] : in no acute distress [General Appearance - Well Nourished] : well nourished [General Appearance - Well Developed] : well developed [General Appearance - Well-Appearing] : healthy appearing [Sclera] : the sclera and conjunctiva were normal [PERRL With Normal Accommodation] : pupils were equal in size, round, and reactive to light [Extraocular Movements] : extraocular movements were intact [Outer Ear] : the ears and nose were normal in appearance [Neck Appearance] : the appearance of the neck was normal [Jugular Venous Distention Increased] : there was no jugular-venous distention [Respiration, Rhythm And Depth] : normal respiratory rhythm and effort [Exaggerated Use Of Accessory Muscles For Inspiration] : no accessory muscle use [Auscultation Breath Sounds / Voice Sounds] : lungs were clear to auscultation bilaterally [Apical Impulse] : the apical impulse was normal [Heart Sounds] : normal S1 and S2 [Heart Sounds Gallop] : no gallops [Murmurs] : no murmurs [Heart Sounds Pericardial Friction Rub] : no pericardial rub [Arterial Pulses Carotid] : carotid pulses were normal with no bruits [Edema] : there was no peripheral edema [Bowel Sounds] : normal bowel sounds [Abdomen Soft] : soft [Abdomen Tenderness] : non-tender [No CVA Tenderness] : no ~M costovertebral angle tenderness [No Spinal Tenderness] : no spinal tenderness [Abnormal Walk] : normal gait [FreeTextEntry1] : L AVF (non functional), with weak thrill [Skin Color & Pigmentation] : normal skin color and pigmentation [Skin Turgor] : normal skin turgor [] : no rash [Skin Lesions] : no skin lesions [Cranial Nerves] : cranial nerves 2-12 were intact [Deep Tendon Reflexes (DTR)] : deep tendon reflexes were 2+ and symmetric [Sensation] : the sensory exam was normal to light touch and pinprick [Motor Exam] : the motor exam was normal [No Focal Deficits] : no focal deficits [Oriented To Time, Place, And Person] : oriented to person, place, and time [Impaired Insight] : insight and judgment were intact [Affect] : the affect was normal [Mood] : the mood was normal

## 2022-12-23 NOTE — ASSESSMENT
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 Trevor Mckeon's office (insurance no longer accepted)\par \par #FSGS at age 18-  s/p failed LDRT from mom ([id number]), s/p DDRT in 2012\par -Well functioning allograft with stable kidney function\par -Continue Tacro 2mg bid (last trough was 4.9; goal 4-6); continue Mycophenolic acid 360 mg bid, prednisone 5 mg PO daily.\par -Continue losartan 50 mg PO daily (last UP/C ratio was 0.1)\par \par hypercalcemia- check PTH.Check thyroid and parathyroid ultrasound.\par \par \par #HTN- \par -Controlled\par -Continue Losartan 50 mg Po daily\par \par \par Hypothyroidism-\par Continue synthroid 75 mcg qD\par Last TSH wnl\par \par \par #HLD-\par -Continue atorvastatin 10 mg qD\par \par \par \par

## 2022-12-23 NOTE — HISTORY OF PRESENT ILLNESS
[FreeTextEntry1] : HPI:  47M with PMH of insect bite at age 7 after which kidneys started failing. DIagnosed with biopsy proven FSGS (no genetic testing done) at age 18, and was started on HD for 2 months, after which received  LDRT from mother at age 18 in 1993 ([facility]). Pt received plasmapharesis  and had DGF at that time for few days after which the kidney lasted until 2007. Pt was started back on HD in 2007 & received it for 5 years. Pt received DDRT (high risk donor with high CDC, high CIT, high cPRA, high DSA s/p thymo induction) in 2012 at [facility]. \par Pt was under the care of  Dr. Trevor Mckeon recently, now sent to us for transfer of care (insurance no longer accepted). No NSAIDs/ herbal medications/ licorice ingestion/ Anabolic steroids/ energy drinks. One cup caffeine daily.\par   \par \par PMH: FSGS s/p failed LDRT ([id number]), s/p DDRT in 2012, HTN, HLD, Hypothyroidism, \par \par PSxH: renal tx x 2, L knee surgery, L AVF (non functional)\par \par Social hx:  No smoking/ etoh/ illicit drugs\par \par Occupation: Transporter at [employer]\par \par Family hx: Grandfather with ESRD on HD at age 50\par \par Allergies: NKDA\par \par \par since last visit \par \par has been feeling okay \par denies any complaints \par \par \par \par \par

## 2023-01-08 ENCOUNTER — RESULT CHARGE (OUTPATIENT)
Age: 48
End: 2023-01-08

## 2023-01-09 ENCOUNTER — APPOINTMENT (OUTPATIENT)
Dept: ORTHOPEDIC SURGERY | Facility: CLINIC | Age: 48
End: 2023-01-09
Payer: COMMERCIAL

## 2023-01-09 VITALS
DIASTOLIC BLOOD PRESSURE: 84 MMHG | SYSTOLIC BLOOD PRESSURE: 128 MMHG | HEIGHT: 63 IN | BODY MASS INDEX: 23.92 KG/M2 | HEART RATE: 100 BPM | WEIGHT: 135 LBS

## 2023-01-09 DIAGNOSIS — M13.861: ICD-10-CM

## 2023-01-09 DIAGNOSIS — M95.8 OTHER SPECIFIED ACQUIRED DEFORMITIES OF MUSCULOSKELETAL SYSTEM: ICD-10-CM

## 2023-01-09 PROCEDURE — 99214 OFFICE O/P EST MOD 30 MIN: CPT

## 2023-01-11 PROBLEM — M13.861 OTHER SPECIFIED ARTHRITIS, RIGHT KNEE: Status: ACTIVE | Noted: 2023-01-09

## 2023-01-11 NOTE — DISCUSSION/SUMMARY
[de-identified] : The patient presents with proximal tib/fib arthrosis.  At this time, I recommend an ultrasonic-guided injection into his proximal tib/fib.  He will be scheduled at this time.

## 2023-01-11 NOTE — PHYSICAL EXAM
[de-identified] : Right knee:\par Knee: Range of Motion in Degrees	\par 	                  Claimant:	Normal:	\par Flexion Active	  135 	                135-degrees	\par Flexion Passive	  135	                135-degrees	\par Extension Active	  0-5	                0-5-degrees	\par Extension Passive	  0-5	                0-5-degrees	\par \par Tenderness over the proximal tib/fib.  No weakness to flexion/extension.  No evidence of instability in the AP plane or varus or valgus stress.  Negative  Lachman.  Negative pivot shift.  Negative anterior drawer test.  Negative posterior drawer test.  Negative Karina.  Negative Apley grind.  No medial or lateral joint line tenderness.  No tenderness over the medial and lateral facet of the patella.  No patellofemoral crepitations.  No lateral tilting patella.  No patellar apprehension.  No crepitation in the medial and lateral femoral condyle.  No proximal or distal swelling, edema or tenderness.  No gross motor or sensory deficits.  No intra-articular swelling.  2+ DP and PT pulses. No varus or valgus malalignment.  Skin is intact.  No rashes, scars or lesions.  \par   [de-identified] : Gait: Slightly antalgic to antalgic.  Station: Normal  [de-identified] : Appearance: Well-developed, well-nourished male  in no acute distress.

## 2023-01-17 PROBLEM — M76.819: Status: ACTIVE | Noted: 2022-12-13

## 2023-01-19 ENCOUNTER — RESULT REVIEW (OUTPATIENT)
Age: 48
End: 2023-01-19

## 2023-01-19 ENCOUNTER — APPOINTMENT (OUTPATIENT)
Dept: INTERVENTIONAL RADIOLOGY/VASCULAR | Facility: CLINIC | Age: 48
End: 2023-01-19
Payer: COMMERCIAL

## 2023-01-19 ENCOUNTER — OUTPATIENT (OUTPATIENT)
Dept: OUTPATIENT SERVICES | Facility: HOSPITAL | Age: 48
LOS: 1 days | End: 2023-01-19

## 2023-01-19 DIAGNOSIS — M13.861 OTHER SPECIFIED ARTHRITIS, RIGHT KNEE: ICD-10-CM

## 2023-01-19 PROCEDURE — 73580 CONTRAST X-RAY OF KNEE JOINT: CPT | Mod: 26,RT

## 2023-01-19 PROCEDURE — 27369 NJX CNTRST KNE ARTHG/CT/MRI: CPT | Mod: RT

## 2023-03-09 ENCOUNTER — APPOINTMENT (OUTPATIENT)
Dept: DERMATOLOGY | Facility: CLINIC | Age: 48
End: 2023-03-09
Payer: COMMERCIAL

## 2023-03-09 PROCEDURE — 99214 OFFICE O/P EST MOD 30 MIN: CPT

## 2023-03-21 ENCOUNTER — NON-APPOINTMENT (OUTPATIENT)
Age: 48
End: 2023-03-21

## 2023-04-10 ENCOUNTER — APPOINTMENT (OUTPATIENT)
Dept: ULTRASOUND IMAGING | Facility: HOSPITAL | Age: 48
End: 2023-04-10

## 2023-04-12 ENCOUNTER — APPOINTMENT (OUTPATIENT)
Dept: ULTRASOUND IMAGING | Facility: CLINIC | Age: 48
End: 2023-04-12
Payer: COMMERCIAL

## 2023-04-12 ENCOUNTER — OUTPATIENT (OUTPATIENT)
Dept: OUTPATIENT SERVICES | Facility: HOSPITAL | Age: 48
LOS: 1 days | End: 2023-04-12
Payer: COMMERCIAL

## 2023-04-12 DIAGNOSIS — E21.3 HYPERPARATHYROIDISM, UNSPECIFIED: ICD-10-CM

## 2023-04-12 PROCEDURE — 76536 US EXAM OF HEAD AND NECK: CPT | Mod: 26

## 2023-04-12 PROCEDURE — 76536 US EXAM OF HEAD AND NECK: CPT

## 2023-04-21 ENCOUNTER — APPOINTMENT (OUTPATIENT)
Dept: NEPHROLOGY | Facility: CLINIC | Age: 48
End: 2023-04-21
Payer: COMMERCIAL

## 2023-04-21 VITALS
BODY MASS INDEX: 24.22 KG/M2 | SYSTOLIC BLOOD PRESSURE: 116 MMHG | OXYGEN SATURATION: 97 % | DIASTOLIC BLOOD PRESSURE: 77 MMHG | HEIGHT: 63 IN | TEMPERATURE: 98.1 F | WEIGHT: 136.68 LBS | HEART RATE: 84 BPM

## 2023-04-21 PROCEDURE — 99215 OFFICE O/P EST HI 40 MIN: CPT

## 2023-04-21 RX ORDER — MYCOPHENOLATE MOFETIL 500 MG/1
TABLET ORAL TWICE DAILY
Refills: 0 | Status: DISCONTINUED | COMMUNITY
Start: 2017-10-03 | End: 2023-04-21

## 2023-04-21 NOTE — PHYSICAL EXAM
[General Appearance - Alert] : alert [General Appearance - Well Nourished] : well nourished [General Appearance - In No Acute Distress] : in no acute distress [General Appearance - Well Developed] : well developed [General Appearance - Well-Appearing] : healthy appearing [Sclera] : the sclera and conjunctiva were normal [PERRL With Normal Accommodation] : pupils were equal in size, round, and reactive to light [Extraocular Movements] : extraocular movements were intact [Outer Ear] : the ears and nose were normal in appearance [Neck Appearance] : the appearance of the neck was normal [Jugular Venous Distention Increased] : there was no jugular-venous distention [] : no respiratory distress [Respiration, Rhythm And Depth] : normal respiratory rhythm and effort [Exaggerated Use Of Accessory Muscles For Inspiration] : no accessory muscle use [Auscultation Breath Sounds / Voice Sounds] : lungs were clear to auscultation bilaterally [Apical Impulse] : the apical impulse was normal [Heart Sounds] : normal S1 and S2 [Heart Sounds Gallop] : no gallops [Murmurs] : no murmurs [Heart Sounds Pericardial Friction Rub] : no pericardial rub [Edema] : there was no peripheral edema [Bowel Sounds] : normal bowel sounds [Abdomen Soft] : soft [Abdomen Tenderness] : non-tender [No CVA Tenderness] : no ~M costovertebral angle tenderness [No Spinal Tenderness] : no spinal tenderness [Abnormal Walk] : normal gait [No Focal Deficits] : no focal deficits [Oriented To Time, Place, And Person] : oriented to person, place, and time [Impaired Insight] : insight and judgment were intact [Affect] : the affect was normal [Mood] : the mood was normal [FreeTextEntry1] : L AVF (non functional), with weak thrill

## 2023-04-21 NOTE — ASSESSMENT
[FreeTextEntry1] :  49 yo M with PMH of HTN, HLD, Hypothyroidism, biopsy proven FSGS at age 18, s/p failed LDRT from mom ([id number]), s/p DDRT in 2012 sent in for transfer of care from Dr. Trevor Mckeon's office (insurance no longer accepted)\par \par #FSGS at age 18-  s/p failed LDRT from mom ([id number]), s/p DDRT in 2012\par -Well functioning allograft with stable kidney function (last SCr on 11/22/21 was 1.04 with BUN 21 & eGFR 87)\par -Continue Tacro 2mg bid (last trough was 4.9; goal 4-6); continue Mycophenolic acid 360 mg bid, prednisone 5 mg PO daily. check labs today. check tacro level. \par -Continue losartan 50 mg PO daily (last UP/C ratio was 0.1)\par \par Hyperparathyroidism- \par \par USG of thyroid and parathyroid shows parathyroid adenomas. will refer to Endocrinology. \par \par labs today \par \par \par \par

## 2023-04-21 NOTE — HISTORY OF PRESENT ILLNESS
[FreeTextEntry1] : Follow up \par \par 47 yo male with PMH of insect bite at age 7 after which he developed rapidly progressive renal failure. DIagnosed with biopsy proven FSGS (no genetic testing done) at age 18, and was started on HD for 2 months, after which received  LDRT from mother at age 18 in 1993 ([facility]). Pt received plasmapharesis  and had DGF at that time for few days after which the kidney lasted until 2007. Pt was started back on HD in 2007 & received it for 5 years. Pt received DDRT (high risk donor with high CDC, high CIT, high cPRA, high DSA s/p thymo induction) in 2012 at [facility]. \par \par Pt was under the care of  Dr. Trevor Mckeon recently, now sent to us for transfer of care (insurance no longer accepted). No NSAIDs/ herbal medications/ licorice ingestion/ Anabolic steroids/ energy drinks. One cup caffeine daily.\par   \par since last visit \par \par he has had issues with allergies but overall feels well \par \par ROS \par \par - No changes in urination, no blood in urine, no foamy urine \par CVS- No chest pain, no shortness of breath\par GI - no nausea/ vomiting, no changes in appetite \par all other systems reviewed in detail and were negative except as above\par

## 2023-05-18 ENCOUNTER — APPOINTMENT (OUTPATIENT)
Dept: ORTHOPEDIC SURGERY | Facility: CLINIC | Age: 48
End: 2023-05-18
Payer: OTHER MISCELLANEOUS

## 2023-05-18 DIAGNOSIS — M75.20 BICIPITAL TENDINITIS, UNSPECIFIED SHOULDER: ICD-10-CM

## 2023-05-18 PROCEDURE — 99205 OFFICE O/P NEW HI 60 MIN: CPT

## 2023-05-18 PROCEDURE — 73010 X-RAY EXAM OF SHOULDER BLADE: CPT | Mod: RT

## 2023-05-18 PROCEDURE — 73030 X-RAY EXAM OF SHOULDER: CPT | Mod: RT

## 2023-05-21 NOTE — REASON FOR VISIT
[FreeTextEntry2] : The patient is here for new right shoulder pain directly related to a work related incident on 5/3/23. The patient is currently working full duty. Patient is RHD.

## 2023-05-21 NOTE — ASSESSMENT
[FreeTextEntry1] : The patient is a 47 yo man presenting with right shoulder pain directly related to a work injury on 5/2/23 at [employer]. The patient is RHD. 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 He reports that his ROM and strength completely diminished and pain increased. He denies paresthesias. The patient has since regained ROM but has decreased strength in certain movements. He is working full time. His pain is anterior and occurs with use of the shoulder or at night. He has tried oral AIs but has not had any other treatment. \par \par \par Right shoulder exam: The patient presents with no apparent distress. Neurovascularly intact. Sensation intact to right upper extremity. No scars, rashes, or abrasions. 2+ pulses to the distal radius.Tender to palpation at proximal biceps tendon and supraspinatus. AROM    ABD 90 ER 55 with pain  IR L3 with pain. 4+/5 RC strength. 5/5 Deltoid strength. +Neer. +Antony. +Speeds. \par \par \par Right shoulder xrays taken today in office: WB -5 views show no evidence of arthritis loose bodies tumors masses or calcification.  Normal study.\par \par Plan at this time is to start with physical therapy for the right shoulder.  We will get authorization for an MRI of the right shoulder to rule out a rotator cuff tear or biceps subluxation.  We will decide on a course of action following the study.  He will continue to work without restrictions.  He has a 25% temporary partial disability to the right shoulder.  His right shoulder pain is causally related to his work accident.\par

## 2023-05-21 NOTE — HISTORY OF PRESENT ILLNESS
[Right Arm] : right arm [Work related] : work related [3] : 3 [0] : 0 [Dull/Aching] : dull/aching [Stabbing] : stabbing [Intermittent] : intermittent [Rest] : rest [] : no [FreeTextEntry3] : 5/3/23 [FreeTextEntry5] : The patient is here for new right shoulder pain directly related to a work related incident on 5/3/23. The patient is currently working full duty. Patient is RHD.  [de-identified] : Nighttime pain

## 2023-06-07 ENCOUNTER — FORM ENCOUNTER (OUTPATIENT)
Age: 48
End: 2023-06-07

## 2023-06-08 ENCOUNTER — APPOINTMENT (OUTPATIENT)
Dept: MRI IMAGING | Facility: CLINIC | Age: 48
End: 2023-06-08
Payer: OTHER MISCELLANEOUS

## 2023-06-08 PROCEDURE — 73221 MRI JOINT UPR EXTREM W/O DYE: CPT | Mod: RT

## 2023-07-10 ENCOUNTER — APPOINTMENT (OUTPATIENT)
Dept: ORTHOPEDIC SURGERY | Facility: CLINIC | Age: 48
End: 2023-07-10
Payer: OTHER MISCELLANEOUS

## 2023-07-10 VITALS — HEIGHT: 63 IN | BODY MASS INDEX: 24.8 KG/M2 | WEIGHT: 140 LBS

## 2023-07-10 PROCEDURE — 99213 OFFICE O/P EST LOW 20 MIN: CPT

## 2023-07-11 NOTE — HISTORY OF PRESENT ILLNESS
[Work related] : work related [0] : 0 [Full time] : Work status: full time [] : no [FreeTextEntry1] : [employer] [FreeTextEntry3] : 5/3/23 [FreeTextEntry5] : 49 y/o M presents for [facility] eval. of Rt. Shldr. Pt reports improvement since last visit, denies any pain. MRI completed 6/8/23 [de-identified] : 5/18/23 [de-identified] : Dr. Wilson [de-identified] : MRI [de-identified] : H

## 2023-07-11 NOTE — ASSESSMENT
[FreeTextEntry1] : The patient comes in today for follow-up on his right shoulder.  He has been to physical therapy and modifying his activities that seems to have really helped.  He is essentially asymptomatic at this time.  He had an MRI which shows a full-thickness tear of the rotator cuff particular the supraspinatus tendon with some retraction.\par \par Examination of the right upper extremity reveals normal neurovascular exam.  Examination right shoulder feels full active and passive range of motion with 5 out of 5 strength of deltoid and rotator cuff.  There is mildly positive Neer and Antony impingement signs.  There is no instability or apprehension.  There is no redness, rashes or visible scars.\par \par Plan at this time is continue home exercises.  Continue to work without restrictions.  He has a 25% temporary partial disability to the right shoulder.  His right shoulder pain is causally related to his work accident.  We will see him back in the office in 3 months for reevaluation.  If it anytime his pain or weakness recur then I would consider an arthroscopic rotator cuff repair.  At this point it does not make sense to operate on him as he is completely asymptomatic.

## 2023-07-12 ENCOUNTER — APPOINTMENT (OUTPATIENT)
Dept: ORTHOPEDIC SURGERY | Facility: CLINIC | Age: 48
End: 2023-07-12
Payer: COMMERCIAL

## 2023-07-12 VITALS — BODY MASS INDEX: 24.8 KG/M2 | HEIGHT: 63 IN | WEIGHT: 140 LBS

## 2023-07-12 DIAGNOSIS — S83.92XS SPRAIN OF UNSPECIFIED SITE OF LEFT KNEE, SEQUELA: ICD-10-CM

## 2023-07-12 PROCEDURE — 99214 OFFICE O/P EST MOD 30 MIN: CPT

## 2023-07-12 PROCEDURE — 73564 X-RAY EXAM KNEE 4 OR MORE: CPT | Mod: LT

## 2023-07-13 PROBLEM — S83.92XS SPRAIN OF LEFT KNEE, UNSPECIFIED LIGAMENT, SEQUELA: Status: ACTIVE | Noted: 2023-07-13

## 2023-07-16 NOTE — HISTORY OF PRESENT ILLNESS
[5] : 5 [0] : 0 [Tightness] : tightness [Intermittent] : intermittent [FreeTextEntry1] : Lt. Knee [] : no [FreeTextEntry5] : 49 y/o M presents for KEKE juarez. of Lt. Knee. Pt reports of atraumatic knee pain that began recently. Hx of TKA 10+ years ago with Dr. Hidalgo. Pt notes of occasional clicking. Increased pain levels when standing for long period of time.

## 2023-07-16 NOTE — ASSESSMENT
[FreeTextEntry1] : The patient is a 47 yo man presenting with left knee pain of the past few weeks. He had a previous left knee arthroscopy with Dr. Hidalgo 10 years ago for a torn meniscus. The patient now walks with a limp and his knee hardeep from time to time. He has pain with walking longer distances and using stairs. He denies new trauma. He denies paresthesias or numbness.  He has pain with squatting and gets occasional locking.  His knee aches at night.  He did twist his knee while walking, that increased his pain. He does use voltaren gel as needed with relief. \par \par Left knee exam: Well appearing male in no apparent distress. No rashes, scars, or abrasions. Neurovascularly intact. Tender to palpation at lateral joint line with reproducible crepitus and a palpable click.  There is a positive Karina's sign laterally.  Ranging from 0-130.  No varus/valgus deformity. Anterior/posterior drawer negative, - Mcmurrays. - Lachmans. Screening exam of the left hip shows a full, painless ROM.\par \par X-rays done in the office today of the left knee 4 views weightbearing shows what appears to be an area of osteochondritis dissecans along the lateral femoral condyle.  There is no narrowing of the joint.  There were no obvious loose bodies.  There is no tumors masses or calcifications seen.\par \par Plan at this point is an MRI of the left knee to rule out a loose body versus an unstable flap or meniscus tear.  We will decide on a course of action following the study.\par

## 2023-07-23 ENCOUNTER — APPOINTMENT (OUTPATIENT)
Dept: MRI IMAGING | Facility: CLINIC | Age: 48
End: 2023-07-23
Payer: COMMERCIAL

## 2023-07-23 ENCOUNTER — OUTPATIENT (OUTPATIENT)
Dept: OUTPATIENT SERVICES | Facility: HOSPITAL | Age: 48
LOS: 1 days | End: 2023-07-23
Payer: COMMERCIAL

## 2023-07-23 ENCOUNTER — RESULT REVIEW (OUTPATIENT)
Age: 48
End: 2023-07-23

## 2023-07-23 DIAGNOSIS — M25.562 PAIN IN LEFT KNEE: ICD-10-CM

## 2023-07-23 PROCEDURE — 73721 MRI JNT OF LWR EXTRE W/O DYE: CPT

## 2023-07-23 PROCEDURE — 73721 MRI JNT OF LWR EXTRE W/O DYE: CPT | Mod: 26,LT

## 2023-08-02 ENCOUNTER — APPOINTMENT (OUTPATIENT)
Dept: ORTHOPEDIC SURGERY | Facility: CLINIC | Age: 48
End: 2023-08-02
Payer: COMMERCIAL

## 2023-08-02 VITALS — HEIGHT: 63 IN | BODY MASS INDEX: 24.8 KG/M2 | WEIGHT: 140 LBS

## 2023-08-02 PROCEDURE — 20611 DRAIN/INJ JOINT/BURSA W/US: CPT | Mod: LT

## 2023-08-02 PROCEDURE — 99213 OFFICE O/P EST LOW 20 MIN: CPT | Mod: 25

## 2023-08-03 NOTE — HISTORY OF PRESENT ILLNESS
[2] : 2 [0] : 0 [Tightness] : tightness [Intermittent] : intermittent [] : no [FreeTextEntry1] : Lt. Knee [FreeTextEntry5] : 49 y/o M presents for f/u eval. of Lt. Knee. Pt reports pain levels have improved. Stiffness persists when walking. [de-identified] : 7/12/23 [de-identified] : Dr. Wilson [de-identified] : MRI

## 2023-08-03 NOTE — ASSESSMENT
[FreeTextEntry1] : The patient is here for a left knee CSI, previously discussed with Dr. Wilson. He had a previous left knee arthroscopy with Dr. Hidalgo 10 years ago for a torn meniscus. The patient now walks with a limp and his knee hardeep from time to time. He has pain with walking longer distances and using stairs. He denies new trauma. He denies paresthesias or numbness.  He has pain with squatting and gets occasional locking.  His knee aches at night.  He did twist his knee while walking, that increased his pain. He does use voltaren gel as needed with relief.   Left knee exam: Well appearing male in no apparent distress. No rashes, scars, or abrasions. Neurovascularly intact. Tender to palpation at lateral joint line with reproducible crepitus and a palpable click.  There is a positive Karina's sign laterally.  Ranging from 0-130.  No varus/valgus deformity. Anterior/posterior drawer negative, - Mcmurrays. - Lachmans. Screening exam of the left hip shows a full, painless ROM  The patient received a left knee CSI today. He tolerated it well . Ultrasound was used. He will follow up as needed.

## 2023-08-03 NOTE — PROCEDURE
[Large Joint Injection] : Large joint injection [Left] : of the left [Knee] : knee [Pain] : pain [Inflammation] : inflammation [X-ray evidence of Osteoarthritis on this or prior visit] : x-ray evidence of Osteoarthritis on this or prior visit [Betadine] : betadine [Ethyl Chloride sprayed topically] : ethyl chloride sprayed topically [Sterile technique used] : sterile technique used [___ cc    1%] : Lidocaine ~Vcc of 1%  [___ cc    0.25%] : Bupivacaine (Marcaine) ~Vcc of 0.25%  [___ cc    80mg] : Methylprednisolone (Depomedrol) ~Vcc of 80 mg  [] : Patient tolerated procedure well [Call if redness, pain or fever occur] : call if redness, pain or fever occur [Previous OTC use and PT nontherapeutic] : patient has tried OTC's including aspirin, Ibuprofen, Aleve, etc or prescription NSAIDS, and/or exercises at home and/or physical therapy without satisfactory response [Patient had decreased mobility in the joint] : patient had decreased mobility in the joint [Risks, benefits, alternatives discussed / Verbal consent obtained] : the risks benefits, and alternatives have been discussed, and verbal consent was obtained [Altered anatomic landmarks d/t erosive arthritis] : altered anatomic landmarks d/t erosive arthritis [All ultrasound images have been permanently captured and stored accordingly in our picture archiving and communication system] : All ultrasound images have been permanently captured and stored accordingly in our picture archiving and communication system [Visualization of the needle and placement of injection was performed without complication] : visualization of the needle and placement of injection was performed without complication

## 2023-08-03 NOTE — REASON FOR VISIT
tried placing an Iv 2x and SLIM mccoy tried as well. all the time were unsuccessful. tried butterfly but only got one vial of blood. told JOHN barton pt needs US IV. waiting US IV. [FreeTextEntry2] : F/U - Lt. Knee

## 2023-08-25 ENCOUNTER — APPOINTMENT (OUTPATIENT)
Dept: NEPHROLOGY | Facility: CLINIC | Age: 48
End: 2023-08-25
Payer: COMMERCIAL

## 2023-08-25 VITALS
TEMPERATURE: 97.1 F | DIASTOLIC BLOOD PRESSURE: 88 MMHG | BODY MASS INDEX: 24.8 KG/M2 | OXYGEN SATURATION: 96 % | SYSTOLIC BLOOD PRESSURE: 132 MMHG | WEIGHT: 140 LBS | HEIGHT: 63 IN | HEART RATE: 89 BPM

## 2023-08-25 DIAGNOSIS — E03.9 HYPOTHYROIDISM, UNSPECIFIED: ICD-10-CM

## 2023-08-25 PROCEDURE — 99214 OFFICE O/P EST MOD 30 MIN: CPT

## 2023-08-25 NOTE — HISTORY OF PRESENT ILLNESS
[FreeTextEntry1] : Follow up   47 yo male with PMH of insect bite at age 7 after which he developed rapidly progressive renal failure. DIagnosed with biopsy proven FSGS (no genetic testing done) at age 18, and was started on HD for 2 months, after which received  LDRT from mother at age 18 in 1993 ([facility]). Pt received plasmapharesis  and had DGF at that time for few days after which the kidney lasted until 2007. Pt was started back on HD in 2007 & received it for 5 years. Pt received DDRT (high risk donor with high CDC, high CIT, high cPRA, high DSA s/p thymo induction) in 2012 at [facility].   Pt was under the care of  Dr. Trevor Mckeon recently, now sent to us for transfer of care (insurance no longer accepted). No NSAIDs/ herbal medications/ licorice ingestion/ Anabolic steroids/ energy drinks. One cup caffeine daily.    since last visit   he feels okay  has a mild sore throat but no fevers

## 2023-08-25 NOTE — ASSESSMENT
[FreeTextEntry1] :  47 yo M with PMH of HTN, HLD, Hypothyroidism, biopsy proven FSGS at age 18, s/p failed LDRT from mom ([id number]), s/p DDRT in 2012 sent in for transfer of care from Dr. Trevor Mckeon's office (insurance no longer accepted)  #FSGS at age 18-  s/p failed LDRT from mom ([id number]), s/p DDRT in 2012 -Well functioning allograft with stable kidney function (last SCr on 11/22/21 was 1.04 with BUN 21 & eGFR 87) -Continue Tacro 2mg bid (last trough was 4.9; goal 4-6); continue Mycophenolic acid 360 mg bid, prednisone 5 mg PO daily. check labs today. check tacro level.  -Continue losartan 50 mg PO daily (last UP/C ratio was 0.1)  Hyperparathyroidism-   USG of thyroid and parathyroid shows parathyroid adenomas. will refer to Endocrinology.   labs today     
yes

## 2023-09-18 ENCOUNTER — NON-APPOINTMENT (OUTPATIENT)
Age: 48
End: 2023-09-18

## 2023-09-18 ENCOUNTER — APPOINTMENT (OUTPATIENT)
Dept: FAMILY MEDICINE | Facility: CLINIC | Age: 48
End: 2023-09-18
Payer: COMMERCIAL

## 2023-09-18 VITALS
TEMPERATURE: 97.3 F | OXYGEN SATURATION: 98 % | BODY MASS INDEX: 23.39 KG/M2 | HEIGHT: 63 IN | SYSTOLIC BLOOD PRESSURE: 140 MMHG | HEART RATE: 84 BPM | WEIGHT: 132 LBS | RESPIRATION RATE: 12 BRPM | DIASTOLIC BLOOD PRESSURE: 100 MMHG

## 2023-09-18 DIAGNOSIS — Z78.9 OTHER SPECIFIED HEALTH STATUS: ICD-10-CM

## 2023-09-18 DIAGNOSIS — Z00.00 ENCOUNTER FOR GENERAL ADULT MEDICAL EXAMINATION W/OUT ABNORMAL FINDINGS: ICD-10-CM

## 2023-09-18 PROCEDURE — 00009S: CUSTOM

## 2023-09-18 PROCEDURE — 00011S: CUSTOM

## 2023-09-18 PROCEDURE — 00018S: CUSTOM

## 2023-09-18 PROCEDURE — 00012S: CUSTOM

## 2023-09-18 PROCEDURE — 00002S: CUSTOM

## 2023-10-18 ENCOUNTER — APPOINTMENT (OUTPATIENT)
Dept: ORTHOPEDIC SURGERY | Facility: CLINIC | Age: 48
End: 2023-10-18
Payer: OTHER MISCELLANEOUS

## 2023-10-18 VITALS — BODY MASS INDEX: 23.39 KG/M2 | HEIGHT: 63 IN | WEIGHT: 132 LBS

## 2023-10-18 DIAGNOSIS — M25.811 OTHER SPECIFIED JOINT DISORDERS, RIGHT SHOULDER: ICD-10-CM

## 2023-10-18 PROCEDURE — 99213 OFFICE O/P EST LOW 20 MIN: CPT

## 2023-10-20 PROBLEM — M25.811 IMPINGEMENT OF RIGHT SHOULDER: Status: ACTIVE | Noted: 2023-05-18

## 2023-11-01 ENCOUNTER — APPOINTMENT (OUTPATIENT)
Dept: ORTHOPEDIC SURGERY | Facility: CLINIC | Age: 48
End: 2023-11-01
Payer: COMMERCIAL

## 2023-11-01 VITALS — WEIGHT: 132 LBS | HEIGHT: 63 IN | BODY MASS INDEX: 23.39 KG/M2

## 2023-11-01 DIAGNOSIS — M17.12 UNILATERAL PRIMARY OSTEOARTHRITIS, LEFT KNEE: ICD-10-CM

## 2023-11-01 DIAGNOSIS — M25.562 PAIN IN LEFT KNEE: ICD-10-CM

## 2023-11-01 DIAGNOSIS — G89.29 PAIN IN LEFT KNEE: ICD-10-CM

## 2023-11-01 PROCEDURE — 20610 DRAIN/INJ JOINT/BURSA W/O US: CPT | Mod: LT

## 2023-11-01 PROCEDURE — 99213 OFFICE O/P EST LOW 20 MIN: CPT | Mod: 25

## 2023-11-01 PROCEDURE — J3490M: CUSTOM

## 2023-11-05 PROBLEM — M17.12 OSTEOARTHRITIS OF LEFT KNEE: Status: ACTIVE | Noted: 2023-07-13

## 2023-11-07 RX ORDER — HYALURONATE SODIUM 20 MG/2 ML
20 SYRINGE (ML) INTRAARTICULAR
Qty: 3 | Refills: 0 | Status: ACTIVE | COMMUNITY
Start: 2023-11-06 | End: 1900-01-01

## 2023-11-13 DIAGNOSIS — R10.13 EPIGASTRIC PAIN: ICD-10-CM

## 2023-11-15 ENCOUNTER — RX RENEWAL (OUTPATIENT)
Age: 48
End: 2023-11-15

## 2023-11-15 RX ORDER — OMEPRAZOLE 40 MG/1
40 CAPSULE, DELAYED RELEASE ORAL
Qty: 90 | Refills: 0 | Status: DISCONTINUED | COMMUNITY
Start: 2023-11-13 | End: 2023-11-15

## 2023-11-15 RX ORDER — OMEPRAZOLE 40 MG/1
40 CAPSULE, DELAYED RELEASE ORAL
Qty: 90 | Refills: 0 | Status: ACTIVE | COMMUNITY
Start: 2023-11-15 | End: 1900-01-01

## 2023-11-15 RX ORDER — SUCRALFATE 1 G/10ML
1 SUSPENSION ORAL 4 TIMES DAILY
Qty: 420 | Refills: 0 | Status: COMPLETED | COMMUNITY
Start: 2023-11-15 | End: 2023-11-25

## 2023-11-15 RX ORDER — SUCRALFATE 1 G/10ML
1 SUSPENSION ORAL 4 TIMES DAILY
Qty: 420 | Refills: 0 | Status: DISCONTINUED | COMMUNITY
Start: 2023-11-15 | End: 2023-11-15

## 2023-11-22 ENCOUNTER — APPOINTMENT (OUTPATIENT)
Dept: GASTROENTEROLOGY | Facility: AMBULATORY MEDICAL SERVICES | Age: 48
End: 2023-11-22
Payer: COMMERCIAL

## 2023-11-22 ENCOUNTER — RESULT REVIEW (OUTPATIENT)
Age: 48
End: 2023-11-22

## 2023-11-22 PROCEDURE — 43239 EGD BIOPSY SINGLE/MULTIPLE: CPT | Mod: GC

## 2023-11-29 ENCOUNTER — NON-APPOINTMENT (OUTPATIENT)
Age: 48
End: 2023-11-29

## 2023-11-29 DIAGNOSIS — R05.9 COUGH, UNSPECIFIED: ICD-10-CM

## 2023-11-30 RX ORDER — SUCRALFATE 1 G/10ML
1 SUSPENSION ORAL 4 TIMES DAILY
Qty: 1200 | Refills: 2 | Status: ACTIVE | COMMUNITY
Start: 2023-11-30 | End: 1900-01-01

## 2023-12-03 ENCOUNTER — OUTPATIENT (OUTPATIENT)
Dept: OUTPATIENT SERVICES | Facility: HOSPITAL | Age: 48
LOS: 1 days | End: 2023-12-03
Payer: COMMERCIAL

## 2023-12-03 ENCOUNTER — APPOINTMENT (OUTPATIENT)
Dept: ULTRASOUND IMAGING | Facility: CLINIC | Age: 48
End: 2023-12-03
Payer: COMMERCIAL

## 2023-12-03 DIAGNOSIS — Z00.8 ENCOUNTER FOR OTHER GENERAL EXAMINATION: ICD-10-CM

## 2023-12-03 PROCEDURE — 76700 US EXAM ABDOM COMPLETE: CPT

## 2023-12-03 PROCEDURE — 76700 US EXAM ABDOM COMPLETE: CPT | Mod: 26

## 2024-01-08 ENCOUNTER — APPOINTMENT (OUTPATIENT)
Dept: NEPHROLOGY | Facility: CLINIC | Age: 49
End: 2024-01-08
Payer: COMMERCIAL

## 2024-01-08 VITALS
BODY MASS INDEX: 23.83 KG/M2 | TEMPERATURE: 97.8 F | WEIGHT: 134.48 LBS | HEIGHT: 63 IN | DIASTOLIC BLOOD PRESSURE: 86 MMHG | OXYGEN SATURATION: 98 % | HEART RATE: 97 BPM | SYSTOLIC BLOOD PRESSURE: 120 MMHG

## 2024-01-08 DIAGNOSIS — Z94.0 KIDNEY TRANSPLANT STATUS: ICD-10-CM

## 2024-01-08 PROCEDURE — 99215 OFFICE O/P EST HI 40 MIN: CPT

## 2024-01-08 RX ORDER — MYCOPHENILIC ACID 360 MG/1
360 TABLET, DELAYED RELEASE ORAL
Qty: 180 | Refills: 3 | Status: ACTIVE | COMMUNITY
Start: 2022-07-13 | End: 1900-01-01

## 2024-01-08 RX ORDER — TACROLIMUS 1 MG/1
1 CAPSULE ORAL TWICE DAILY
Qty: 360 | Refills: 3 | Status: ACTIVE | COMMUNITY
Start: 1900-01-01 | End: 1900-01-01

## 2024-01-08 RX ORDER — AMOXICILLIN AND CLAVULANATE POTASSIUM 875; 125 MG/1; MG/1
875-125 TABLET, COATED ORAL
Qty: 14 | Refills: 0 | Status: DISCONTINUED | COMMUNITY
Start: 2023-11-29 | End: 2024-01-08

## 2024-01-08 RX ORDER — PREDNISONE 5 MG/1
5 TABLET ORAL DAILY
Qty: 90 | Refills: 3 | Status: ACTIVE | COMMUNITY
Start: 2017-06-29 | End: 1900-01-01

## 2024-01-08 RX ORDER — AZITHROMYCIN 250 MG/1
250 TABLET, FILM COATED ORAL
Qty: 1 | Refills: 0 | Status: DISCONTINUED | COMMUNITY
Start: 2023-03-21 | End: 2024-01-08

## 2024-01-08 RX ORDER — LOSARTAN POTASSIUM 50 MG/1
50 TABLET, FILM COATED ORAL
Qty: 90 | Refills: 3 | Status: ACTIVE | COMMUNITY
Start: 1900-01-01 | End: 1900-01-01

## 2024-01-08 RX ORDER — DIBASIC SODIUM PHOSPHATE, MONOBASIC POTASSIUM PHOSPHATE AND MONOBASIC SODIUM PHOSPHATE 852; 155; 130 MG/1; MG/1; MG/1
155-852-130 TABLET ORAL
Qty: 270 | Refills: 3 | Status: ACTIVE | COMMUNITY
Start: 1900-01-01 | End: 1900-01-01

## 2024-01-08 RX ORDER — ATORVASTATIN CALCIUM 10 MG/1
10 TABLET, FILM COATED ORAL
Qty: 90 | Refills: 3 | Status: ACTIVE | COMMUNITY
Start: 2017-06-29 | End: 1900-01-01

## 2024-01-08 RX ORDER — LEVOTHYROXINE SODIUM 0.07 MG/1
75 TABLET ORAL DAILY
Qty: 90 | Refills: 3 | Status: ACTIVE | COMMUNITY
Start: 2024-01-08 | End: 1900-01-01

## 2024-01-08 RX ORDER — LEVOTHYROXINE SODIUM 0.07 MG/1
75 TABLET ORAL DAILY
Qty: 90 | Refills: 3 | Status: ACTIVE | COMMUNITY
Start: 1900-01-01 | End: 1900-01-01

## 2024-01-10 PROBLEM — Z94.0 S/P KIDNEY TRANSPLANT: Status: ACTIVE | Noted: 2022-01-30

## 2024-01-10 NOTE — HISTORY OF PRESENT ILLNESS
[FreeTextEntry1] : Follow up   50 yo male with PMH of insect bite at age 7 after which he developed rapidly progressive renal failure. Diagnosed with biopsy proven FSGS (no genetic testing done) at age 18, and was started on HD for 2 months, after which received LDRT from mother at age 18 in 1993 ([facility]). Pt received plasmapharesis  and had DGF at that time for few days after which the kidney lasted until 2007. Pt was started back on HD in 2007 & received it for 5 years. Pt received DDRT (high risk donor with high CDC, high CIT, high cPRA, high DSA s/p thymo induction) in 2012 at [facility].   No NSAIDs/ herbal medications/ licorice ingestion/ Anabolic steroids/ energy drinks. One cup caffeine daily.    since last visit, he has felt well. denies any complaints

## 2024-01-17 ENCOUNTER — APPOINTMENT (OUTPATIENT)
Dept: ORTHOPEDIC SURGERY | Facility: CLINIC | Age: 49
End: 2024-01-17
Payer: OTHER MISCELLANEOUS

## 2024-01-17 VITALS — HEIGHT: 63 IN | WEIGHT: 134 LBS | BODY MASS INDEX: 23.74 KG/M2

## 2024-01-17 DIAGNOSIS — S46.011D STRAIN OF MUSCLE(S) AND TENDON(S) OF THE ROTATOR CUFF OF RIGHT SHOULDER, SUBSEQUENT ENCOUNTER: ICD-10-CM

## 2024-01-17 PROCEDURE — 99213 OFFICE O/P EST LOW 20 MIN: CPT

## 2024-01-18 PROBLEM — S46.011D TRAUMATIC COMPLETE TEAR OF RIGHT ROTATOR CUFF, SUBSEQUENT ENCOUNTER: Status: ACTIVE | Noted: 2023-07-11

## 2024-01-18 NOTE — ASSESSMENT
[FreeTextEntry1] : The patient is here for right shoulder RCT follow up. He is working full time. His previous SA Injection was helpful and he does not require another one today. The patient has only pain with palpation of the anterolateral shoulder but not with use or lifting activity. He denies new trauma. He denies paresthesias. He has mild pain with overuse or repetitive activity.  The patient is a 49 yo man presenting with right shoulder pain directly related to a work injury on 5/2/23 at [facility]. The patient is RHD. 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 He reports that his ROM and strength have increased slowly and pain decreased. He denies paresthesias. He is working full time. His pain is anterior and occurs with use of the shoulder or at night.   Right shoulder exam: The patient presents with no apparent distress. Neurovascularly intact. Sensation intact to right upper extremity. No scars, rashes, or abrasions. 2+ pulses to the distal radius.Non tender to palpation at proximal biceps tendon and supraspinatus. AROM  ABD 90 ER 55 with pain IR L3 with pain. 4+/5 RC strength. 5/5 Deltoid strength. +Neer. +Antony. +Speeds.  The patient will continue HEP. He will use oral AIs as needed. He will return for a second CSI as needed. He will continue to work without restrictions. He has a 25% temporary partial disability to the right shoulder. His right shoulder pain is causally related to his work accident.

## 2024-01-18 NOTE — HISTORY OF PRESENT ILLNESS
[Work related] : work related [0] : 0 [Full time] : Work status: full time [] : Post Surgical Visit: no [FreeTextEntry1] : [employer] [FreeTextEntry3] : 5/3/23 [FreeTextEntry5] : 49 y/o M presents for [facility] eval. of Rt. Shldr. pt states no pain. [de-identified] : Dr. Wilson

## 2024-03-14 ENCOUNTER — APPOINTMENT (OUTPATIENT)
Dept: DERMATOLOGY | Facility: CLINIC | Age: 49
End: 2024-03-14
Payer: COMMERCIAL

## 2024-03-14 PROCEDURE — 99214 OFFICE O/P EST MOD 30 MIN: CPT | Mod: 25

## 2024-03-14 PROCEDURE — 17110 DESTRUCTION B9 LES UP TO 14: CPT

## 2024-03-25 RX ORDER — CYCLOBENZAPRINE HYDROCHLORIDE 10 MG/1
10 TABLET, FILM COATED ORAL
Qty: 90 | Refills: 2 | Status: ACTIVE | COMMUNITY
Start: 2020-09-03 | End: 1900-01-01

## 2024-03-26 DIAGNOSIS — K21.9 GASTRO-ESOPHAGEAL REFLUX DISEASE W/OUT ESOPHAGITIS: ICD-10-CM

## 2024-03-26 RX ORDER — OMEPRAZOLE 40 MG/1
40 CAPSULE, DELAYED RELEASE ORAL
Qty: 90 | Refills: 3 | Status: ACTIVE | COMMUNITY
Start: 2024-03-26 | End: 1900-01-01

## 2024-04-19 ENCOUNTER — APPOINTMENT (OUTPATIENT)
Dept: NEPHROLOGY | Facility: CLINIC | Age: 49
End: 2024-04-19
Payer: COMMERCIAL

## 2024-04-19 VITALS
HEIGHT: 63 IN | SYSTOLIC BLOOD PRESSURE: 143 MMHG | BODY MASS INDEX: 23.47 KG/M2 | HEART RATE: 81 BPM | WEIGHT: 132.48 LBS | TEMPERATURE: 97.1 F | OXYGEN SATURATION: 98 % | DIASTOLIC BLOOD PRESSURE: 93 MMHG

## 2024-04-19 DIAGNOSIS — D84.9 IMMUNODEFICIENCY, UNSPECIFIED: ICD-10-CM

## 2024-04-19 DIAGNOSIS — Z86.79 PERSONAL HISTORY OF OTHER DISEASES OF THE CIRCULATORY SYSTEM: ICD-10-CM

## 2024-04-19 DIAGNOSIS — I10 ESSENTIAL (PRIMARY) HYPERTENSION: ICD-10-CM

## 2024-04-19 DIAGNOSIS — E21.3 HYPERPARATHYROIDISM, UNSPECIFIED: ICD-10-CM

## 2024-04-19 DIAGNOSIS — N05.1 UNSPECIFIED NEPHRITIC SYNDROME WITH FOCAL AND SEGMENTAL GLOMERULAR LESIONS: ICD-10-CM

## 2024-04-19 LAB
24R-OH-CALCIDIOL SERPL-MCNC: 62 PG/ML
25(OH)D3 SERPL-MCNC: 40.1 NG/ML
25(OH)D3 SERPL-MCNC: 43 NG/ML
25(OH)D3 SERPL-MCNC: 50.8 NG/ML
25(OH)D3 SERPL-MCNC: 56.5 NG/ML
ALBUMIN MFR SERPL ELPH: 61.3 %
ALBUMIN SERPL ELPH-MCNC: 4.4 G/DL
ALBUMIN SERPL ELPH-MCNC: 4.7 G/DL
ALBUMIN SERPL ELPH-MCNC: 4.7 G/DL
ALBUMIN SERPL ELPH-MCNC: 4.8 G/DL
ALBUMIN SERPL-MCNC: 4.2 G/DL
ALBUMIN/GLOB SERPL: 1.6 RATIO
ALP BLD-CCNC: 94 U/L
ALPHA1 GLOB MFR SERPL ELPH: 5 %
ALPHA1 GLOB SERPL ELPH-MCNC: 0.3 G/DL
ALPHA2 GLOB MFR SERPL ELPH: 10.1 %
ALPHA2 GLOB SERPL ELPH-MCNC: 0.7 G/DL
ALT SERPL-CCNC: 38 U/L
ANION GAP SERPL CALC-SCNC: 11 MMOL/L
ANION GAP SERPL CALC-SCNC: 14 MMOL/L
ANION GAP SERPL CALC-SCNC: 14 MMOL/L
ANION GAP SERPL CALC-SCNC: 16 MMOL/L
ANION GAP SERPL CALC-SCNC: 16 MMOL/L
APPEARANCE: CLEAR
AST SERPL-CCNC: 24 U/L
B-GLOBULIN MFR SERPL ELPH: 9.6 %
B-GLOBULIN SERPL ELPH-MCNC: 0.7 G/DL
BACTERIA: NEGATIVE
BACTERIA: NEGATIVE /HPF
BACTERIA: NEGATIVE /HPF
BASOPHILS # BLD AUTO: 0.04 K/UL
BASOPHILS # BLD AUTO: 0.05 K/UL
BASOPHILS # BLD AUTO: 0.06 K/UL
BASOPHILS NFR BLD AUTO: 0.6 %
BASOPHILS NFR BLD AUTO: 0.7 %
BASOPHILS NFR BLD AUTO: 1.1 %
BILIRUB SERPL-MCNC: 0.6 MG/DL
BILIRUBIN URINE: NEGATIVE
BLOOD URINE: NEGATIVE
BUN SERPL-MCNC: 18 MG/DL
BUN SERPL-MCNC: 19 MG/DL
BUN SERPL-MCNC: 20 MG/DL
BUN SERPL-MCNC: 20 MG/DL
BUN SERPL-MCNC: 23 MG/DL
CALCIUM SERPL-MCNC: 10.1 MG/DL
CALCIUM SERPL-MCNC: 10.1 MG/DL
CALCIUM SERPL-MCNC: 10.6 MG/DL
CALCIUM SERPL-MCNC: 10.6 MG/DL
CALCIUM SERPL-MCNC: 10.7 MG/DL
CALCIUM SERPL-MCNC: 10.9 MG/DL
CALCIUM SERPL-MCNC: 11 MG/DL
CAST: 0 /LPF
CAST: 0 /LPF
CHLORIDE SERPL-SCNC: 103 MMOL/L
CHLORIDE SERPL-SCNC: 105 MMOL/L
CHLORIDE SERPL-SCNC: 105 MMOL/L
CHLORIDE SERPL-SCNC: 107 MMOL/L
CHLORIDE SERPL-SCNC: 107 MMOL/L
CHOLEST SERPL-MCNC: 191 MG/DL
CO2 SERPL-SCNC: 20 MMOL/L
CO2 SERPL-SCNC: 21 MMOL/L
CO2 SERPL-SCNC: 22 MMOL/L
CO2 SERPL-SCNC: 25 MMOL/L
CO2 SERPL-SCNC: 27 MMOL/L
COLOR: COLORLESS
COLOR: NORMAL
COLOR: NORMAL
COLOR: YELLOW
COLOR: YELLOW
COVID-19 NUCLEOCAPSID  GAM ANTIBODY INTERPRETATION: NEGATIVE
COVID-19 NUCLEOCAPSID  GAM ANTIBODY INTERPRETATION: NEGATIVE
COVID-19 SPIKE DOMAIN ANTIBODY INTERPRETATION: POSITIVE
COVID-19 SPIKE DOMAIN ANTIBODY INTERPRETATION: POSITIVE
CREAT SERPL-MCNC: 0.97 MG/DL
CREAT SERPL-MCNC: 0.98 MG/DL
CREAT SERPL-MCNC: 1 MG/DL
CREAT SERPL-MCNC: 1.01 MG/DL
CREAT SERPL-MCNC: 1.03 MG/DL
CREAT SPEC-SCNC: 113 MG/DL
CREAT SPEC-SCNC: 23 MG/DL
CREAT SPEC-SCNC: 63 MG/DL
CREAT SPEC-SCNC: 65 MG/DL
CREAT SPEC-SCNC: 75 MG/DL
CREAT/PROT UR: 0.1 RATIO
CREAT/PROT UR: 0.1 RATIO
CREAT/PROT UR: 0.2 RATIO
CREAT/PROT UR: 0.2 RATIO
CREAT/PROT UR: NORMAL RATIO
DEPRECATED KAPPA LC FREE/LAMBDA SER: 1.24 RATIO
EGFR: 91 ML/MIN/1.73M2
EGFR: 93 ML/MIN/1.73M2
EGFR: 94 ML/MIN/1.73M2
EGFR: 97 ML/MIN/1.73M2
EGFR: 97 ML/MIN/1.73M2
EOSINOPHIL # BLD AUTO: 0.16 K/UL
EOSINOPHIL # BLD AUTO: 0.17 K/UL
EOSINOPHIL # BLD AUTO: 0.17 K/UL
EOSINOPHIL NFR BLD AUTO: 2.3 %
EOSINOPHIL NFR BLD AUTO: 2.7 %
EOSINOPHIL NFR BLD AUTO: 3.2 %
EPITHELIAL CELLS: 0 /HPF
EPITHELIAL CELLS: 0 /HPF
ESTIMATED AVERAGE GLUCOSE: 108 MG/DL
ESTIMATED AVERAGE GLUCOSE: 117 MG/DL
FERRITIN SERPL-MCNC: 185 NG/ML
FERRITIN SERPL-MCNC: 209 NG/ML
FERRITIN SERPL-MCNC: 60 NG/ML
GAMMA GLOB FLD ELPH-MCNC: 1 G/DL
GAMMA GLOB MFR SERPL ELPH: 14 %
GLUCOSE QUALITATIVE U: NEGATIVE
GLUCOSE QUALITATIVE U: NEGATIVE MG/DL
GLUCOSE QUALITATIVE U: NEGATIVE MG/DL
GLUCOSE SERPL-MCNC: 41 MG/DL
GLUCOSE SERPL-MCNC: 82 MG/DL
GLUCOSE SERPL-MCNC: 93 MG/DL
GLUCOSE SERPL-MCNC: 95 MG/DL
GLUCOSE SERPL-MCNC: 96 MG/DL
HBA1C MFR BLD HPLC: 5.4 %
HBA1C MFR BLD HPLC: 5.7 %
HCT VFR BLD CALC: 48.4 %
HCT VFR BLD CALC: 49.4 %
HCT VFR BLD CALC: 51.9 %
HDLC SERPL-MCNC: 61 MG/DL
HGB BLD-MCNC: 15 G/DL
HGB BLD-MCNC: 15.5 G/DL
HGB BLD-MCNC: 15.9 G/DL
HYALINE CASTS: 0 /LPF
IMM GRANULOCYTES NFR BLD AUTO: 0.8 %
IMM GRANULOCYTES NFR BLD AUTO: 1 %
IMM GRANULOCYTES NFR BLD AUTO: 1.3 %
INTERPRETATION SERPL IEP-IMP: NORMAL
IRON SATN MFR SERPL: 39 %
IRON SATN MFR SERPL: 41 %
IRON SATN MFR SERPL: 55 %
IRON SERPL-MCNC: 124 UG/DL
IRON SERPL-MCNC: 146 UG/DL
IRON SERPL-MCNC: 199 UG/DL
KAPPA LC CSF-MCNC: 2.02 MG/DL
KAPPA LC SERPL-MCNC: 2.51 MG/DL
KETONES URINE: NEGATIVE
KETONES URINE: NEGATIVE MG/DL
KETONES URINE: NEGATIVE MG/DL
LDLC SERPL CALC-MCNC: 95 MG/DL
LEUKOCYTE ESTERASE URINE: NEGATIVE
LYMPHOCYTES # BLD AUTO: 0.79 K/UL
LYMPHOCYTES # BLD AUTO: 0.85 K/UL
LYMPHOCYTES # BLD AUTO: 0.89 K/UL
LYMPHOCYTES NFR BLD AUTO: 11.2 %
LYMPHOCYTES NFR BLD AUTO: 14 %
LYMPHOCYTES NFR BLD AUTO: 16.2 %
MAGNESIUM SERPL-MCNC: 1.9 MG/DL
MAN DIFF?: NORMAL
MCHC RBC-ENTMCNC: 29.4 PG
MCHC RBC-ENTMCNC: 30 PG
MCHC RBC-ENTMCNC: 30 PG
MCHC RBC-ENTMCNC: 30.6 GM/DL
MCHC RBC-ENTMCNC: 31 GM/DL
MCHC RBC-ENTMCNC: 31.4 GM/DL
MCV RBC AUTO: 94.7 FL
MCV RBC AUTO: 95.7 FL
MCV RBC AUTO: 97.9 FL
MICROSCOPIC-UA: NORMAL
MONOCYTES # BLD AUTO: 0.45 K/UL
MONOCYTES # BLD AUTO: 0.51 K/UL
MONOCYTES # BLD AUTO: 0.56 K/UL
MONOCYTES NFR BLD AUTO: 8 %
MONOCYTES NFR BLD AUTO: 8 %
MONOCYTES NFR BLD AUTO: 8.6 %
NEUTROPHILS # BLD AUTO: 3.68 K/UL
NEUTROPHILS # BLD AUTO: 4.69 K/UL
NEUTROPHILS # BLD AUTO: 5.38 K/UL
NEUTROPHILS NFR BLD AUTO: 69.9 %
NEUTROPHILS NFR BLD AUTO: 73.9 %
NEUTROPHILS NFR BLD AUTO: 76.5 %
NITRITE URINE: NEGATIVE
NONHDLC SERPL-MCNC: 130 MG/DL
PARATHYROID HORMONE INTACT: 145 PG/ML
PARATHYROID HORMONE INTACT: 163 PG/ML
PARATHYROID HORMONE INTACT: 166 PG/ML
PARATHYROID HORMONE INTACT: 173 PG/ML
PARATHYROID HORMONE INTACT: 188 PG/ML
PH URINE: 6
PH URINE: 6.5
PHOSPHATE SERPL-MCNC: 1.2 MG/DL
PHOSPHATE SERPL-MCNC: 1.6 MG/DL
PHOSPHATE SERPL-MCNC: 1.6 MG/DL
PHOSPHATE SERPL-MCNC: 1.8 MG/DL
PLATELET # BLD AUTO: 226 K/UL
PLATELET # BLD AUTO: 234 K/UL
PLATELET # BLD AUTO: 262 K/UL
POTASSIUM SERPL-SCNC: 4.3 MMOL/L
POTASSIUM SERPL-SCNC: 4.4 MMOL/L
POTASSIUM SERPL-SCNC: 4.4 MMOL/L
POTASSIUM SERPL-SCNC: 4.6 MMOL/L
POTASSIUM SERPL-SCNC: 5 MMOL/L
PROT SERPL-MCNC: 6.6 G/DL
PROT SERPL-MCNC: 6.8 G/DL
PROT SERPL-MCNC: 6.8 G/DL
PROT UR-MCNC: 25 MG/DL
PROT UR-MCNC: 7 MG/DL
PROT UR-MCNC: 9 MG/DL
PROT UR-MCNC: 9 MG/DL
PROT UR-MCNC: <4 MG/DL
PROTEIN URINE: ABNORMAL
PROTEIN URINE: NEGATIVE
PROTEIN URINE: NEGATIVE
PROTEIN URINE: NEGATIVE MG/DL
PROTEIN URINE: NEGATIVE MG/DL
RBC # BLD: 5.11 M/UL
RBC # BLD: 5.16 M/UL
RBC # BLD: 5.3 M/UL
RBC # FLD: 13 %
RBC # FLD: 13.1 %
RBC # FLD: 13.4 %
RED BLOOD CELLS URINE: 0 /HPF
RED BLOOD CELLS URINE: 1 /HPF
RED BLOOD CELLS URINE: 3 /HPF
SARS-COV-2 AB SERPL IA-ACNC: >250 U/ML
SARS-COV-2 AB SERPL IA-ACNC: >250 U/ML
SARS-COV-2 AB SERPL QL IA: 0.06 INDEX
SARS-COV-2 AB SERPL QL IA: 0.09 INDEX
SODIUM SERPL-SCNC: 141 MMOL/L
SODIUM SERPL-SCNC: 141 MMOL/L
SODIUM SERPL-SCNC: 143 MMOL/L
SODIUM SERPL-SCNC: 144 MMOL/L
SODIUM SERPL-SCNC: 144 MMOL/L
SPECIFIC GRAVITY URINE: 1.01
SPECIFIC GRAVITY URINE: 1.02
SQUAMOUS EPITHELIAL CELLS: 0 /HPF
TACROLIMUS SERPL-MCNC: 5.2 NG/ML
TACROLIMUS SERPL-MCNC: 5.9 NG/ML
TACROLIMUS SERPL-MCNC: 6.6 NG/ML
TIBC SERPL-MCNC: 304 UG/DL
TIBC SERPL-MCNC: 361 UG/DL
TIBC SERPL-MCNC: 370 UG/DL
TRIGL SERPL-MCNC: 207 MG/DL
TSH SERPL-ACNC: 0.59 UIU/ML
TSH SERPL-ACNC: 0.81 UIU/ML
UIBC SERPL-MCNC: 161 UG/DL
UIBC SERPL-MCNC: 180 UG/DL
UIBC SERPL-MCNC: 224 UG/DL
UROBILINOGEN URINE: 0.2 MG/DL
UROBILINOGEN URINE: 0.2 MG/DL
UROBILINOGEN URINE: NORMAL
WBC # FLD AUTO: 5.26 K/UL
WBC # FLD AUTO: 6.35 K/UL
WBC # FLD AUTO: 7.03 K/UL
WHITE BLOOD CELLS URINE: 0 /HPF

## 2024-04-19 PROCEDURE — 99215 OFFICE O/P EST HI 40 MIN: CPT

## 2024-06-20 LAB
ANION GAP SERPL CALC-SCNC: 10 MMOL/L
BUN SERPL-MCNC: 15 MG/DL
CALCIUM SERPL-MCNC: 10.4 MG/DL
CHLORIDE SERPL-SCNC: 108 MMOL/L
CO2 SERPL-SCNC: 23 MMOL/L
CREAT SERPL-MCNC: 1 MG/DL
EGFR: 93 ML/MIN/1.73M2
GLUCOSE SERPL-MCNC: 89 MG/DL
PHOSPHATE SERPL-MCNC: 1.5 MG/DL
POTASSIUM SERPL-SCNC: 4.7 MMOL/L
SODIUM SERPL-SCNC: 141 MMOL/L
TACROLIMUS SERPL-MCNC: 5.9 NG/ML

## 2024-09-28 ENCOUNTER — OUTPATIENT (OUTPATIENT)
Dept: OUTPATIENT SERVICES | Facility: HOSPITAL | Age: 49
LOS: 1 days | End: 2024-09-28
Payer: COMMERCIAL

## 2024-09-28 ENCOUNTER — RESULT REVIEW (OUTPATIENT)
Age: 49
End: 2024-09-28

## 2024-09-28 ENCOUNTER — APPOINTMENT (OUTPATIENT)
Dept: MRI IMAGING | Facility: CLINIC | Age: 49
End: 2024-09-28

## 2024-09-28 DIAGNOSIS — M19.042 PRIMARY OSTEOARTHRITIS, LEFT HAND: ICD-10-CM

## 2024-09-28 PROCEDURE — 73221 MRI JOINT UPR EXTREM W/O DYE: CPT | Mod: 26,LT

## 2024-09-28 PROCEDURE — 73221 MRI JOINT UPR EXTREM W/O DYE: CPT

## 2024-11-16 ENCOUNTER — APPOINTMENT (OUTPATIENT)
Dept: CT IMAGING | Facility: CLINIC | Age: 49
End: 2024-11-16

## 2024-11-16 ENCOUNTER — OUTPATIENT (OUTPATIENT)
Dept: OUTPATIENT SERVICES | Facility: HOSPITAL | Age: 49
LOS: 1 days | End: 2024-11-16
Payer: COMMERCIAL

## 2024-11-16 DIAGNOSIS — Z00.8 ENCOUNTER FOR OTHER GENERAL EXAMINATION: ICD-10-CM

## 2024-11-16 PROCEDURE — 75571 CT HRT W/O DYE W/CA TEST: CPT | Mod: 26

## 2024-11-16 PROCEDURE — 75571 CT HRT W/O DYE W/CA TEST: CPT

## 2024-12-20 ENCOUNTER — APPOINTMENT (OUTPATIENT)
Dept: NEPHROLOGY | Facility: CLINIC | Age: 49
End: 2024-12-20
Payer: COMMERCIAL

## 2024-12-20 ENCOUNTER — NON-APPOINTMENT (OUTPATIENT)
Age: 49
End: 2024-12-20

## 2024-12-20 VITALS
HEART RATE: 89 BPM | OXYGEN SATURATION: 97 % | SYSTOLIC BLOOD PRESSURE: 115 MMHG | WEIGHT: 130.07 LBS | BODY MASS INDEX: 23.05 KG/M2 | HEIGHT: 63 IN | DIASTOLIC BLOOD PRESSURE: 77 MMHG | TEMPERATURE: 98.1 F

## 2024-12-20 DIAGNOSIS — J40 BRONCHITIS, NOT SPECIFIED AS ACUTE OR CHRONIC: ICD-10-CM

## 2024-12-20 DIAGNOSIS — Z94.0 KIDNEY TRANSPLANT STATUS: ICD-10-CM

## 2024-12-20 DIAGNOSIS — N05.1 UNSPECIFIED NEPHRITIC SYNDROME WITH FOCAL AND SEGMENTAL GLOMERULAR LESIONS: ICD-10-CM

## 2024-12-20 DIAGNOSIS — D84.9 IMMUNODEFICIENCY, UNSPECIFIED: ICD-10-CM

## 2024-12-20 LAB
25(OH)D3 SERPL-MCNC: 88.3 NG/ML
ALBUMIN SERPL ELPH-MCNC: 4.2 G/DL
ALP BLD-CCNC: 74 U/L
ALT SERPL-CCNC: 23 U/L
ANION GAP SERPL CALC-SCNC: 10 MMOL/L
AST SERPL-CCNC: 17 U/L
BASOPHILS # BLD AUTO: 0.06 K/UL
BASOPHILS NFR BLD AUTO: 0.8 %
BILIRUB SERPL-MCNC: 1 MG/DL
BUN SERPL-MCNC: 22 MG/DL
CALCIUM SERPL-MCNC: 10.7 MG/DL
CALCIUM SERPL-MCNC: 10.7 MG/DL
CHLORIDE SERPL-SCNC: 109 MMOL/L
CO2 SERPL-SCNC: 25 MMOL/L
CREAT SERPL-MCNC: 1.01 MG/DL
EGFR: 92 ML/MIN/1.73M2
EOSINOPHIL # BLD AUTO: 0.15 K/UL
EOSINOPHIL NFR BLD AUTO: 2.1 %
GLUCOSE SERPL-MCNC: 88 MG/DL
HCT VFR BLD CALC: 46.9 %
HGB BLD-MCNC: 15.3 G/DL
IMM GRANULOCYTES NFR BLD AUTO: 0.6 %
LYMPHOCYTES # BLD AUTO: 0.81 K/UL
LYMPHOCYTES NFR BLD AUTO: 11.3 %
MAN DIFF?: NORMAL
MCHC RBC-ENTMCNC: 29.8 PG
MCHC RBC-ENTMCNC: 32.6 G/DL
MCV RBC AUTO: 91.4 FL
MONOCYTES # BLD AUTO: 0.54 K/UL
MONOCYTES NFR BLD AUTO: 7.6 %
NEUTROPHILS # BLD AUTO: 5.54 K/UL
NEUTROPHILS NFR BLD AUTO: 77.6 %
PARATHYROID HORMONE INTACT: 130 PG/ML
PHOSPHATE SERPL-MCNC: 1.6 MG/DL
PLATELET # BLD AUTO: 272 K/UL
POTASSIUM SERPL-SCNC: 4.4 MMOL/L
PROT SERPL-MCNC: 6.7 G/DL
RBC # BLD: 5.13 M/UL
RBC # FLD: 13 %
SODIUM SERPL-SCNC: 143 MMOL/L
TACROLIMUS SERPL-MCNC: 4.1 NG/ML
WBC # FLD AUTO: 7.14 K/UL

## 2024-12-20 PROCEDURE — G2211 COMPLEX E/M VISIT ADD ON: CPT

## 2024-12-20 PROCEDURE — 99215 OFFICE O/P EST HI 40 MIN: CPT | Mod: GC

## 2024-12-20 RX ORDER — AMOXICILLIN AND CLAVULANATE POTASSIUM 875; 125 MG/1; MG/1
875-125 TABLET, COATED ORAL
Qty: 14 | Refills: 0 | Status: ACTIVE | COMMUNITY
Start: 2024-12-20 | End: 1900-01-01

## 2024-12-21 LAB
APPEARANCE: CLEAR
BACTERIA: NEGATIVE /HPF
BILIRUBIN URINE: NEGATIVE
BLOOD URINE: NEGATIVE
CAST: 0 /LPF
COLOR: YELLOW
CREAT SPEC-SCNC: 185 MG/DL
CREAT/PROT UR: 0.2 RATIO
EPITHELIAL CELLS: 0 /HPF
GLUCOSE QUALITATIVE U: NEGATIVE MG/DL
KETONES URINE: NEGATIVE MG/DL
LEUKOCYTE ESTERASE URINE: NEGATIVE
MICROSCOPIC-UA: NORMAL
NITRITE URINE: NEGATIVE
PH URINE: 6
PROT UR-MCNC: 35 MG/DL
PROTEIN URINE: 30 MG/DL
RED BLOOD CELLS URINE: 1 /HPF
SPECIFIC GRAVITY URINE: 1.03
UROBILINOGEN URINE: 0.2 MG/DL
WHITE BLOOD CELLS URINE: 0 /HPF

## 2025-02-05 ENCOUNTER — OUTPATIENT (OUTPATIENT)
Dept: OUTPATIENT SERVICES | Facility: HOSPITAL | Age: 50
LOS: 1 days | End: 2025-02-05

## 2025-02-05 ENCOUNTER — APPOINTMENT (OUTPATIENT)
Dept: MRI IMAGING | Facility: CLINIC | Age: 50
End: 2025-02-05
Payer: COMMERCIAL

## 2025-02-05 DIAGNOSIS — Q23.88 OTHER CONGENITAL MALFORMATIONS OF AORTIC AND MITRAL VALVES: ICD-10-CM

## 2025-02-05 PROCEDURE — 75565 CARD MRI VELOC FLOW MAPPING: CPT | Mod: 26

## 2025-02-05 PROCEDURE — 75561 CARDIAC MRI FOR MORPH W/DYE: CPT | Mod: 26

## 2025-02-27 ENCOUNTER — NON-APPOINTMENT (OUTPATIENT)
Age: 50
End: 2025-02-27

## 2025-03-13 ENCOUNTER — APPOINTMENT (OUTPATIENT)
Dept: DERMATOLOGY | Facility: CLINIC | Age: 50
End: 2025-03-13
Payer: COMMERCIAL

## 2025-03-13 PROCEDURE — 99214 OFFICE O/P EST MOD 30 MIN: CPT

## 2025-04-01 ENCOUNTER — APPOINTMENT (OUTPATIENT)
Age: 50
End: 2025-04-01
Payer: COMMERCIAL

## 2025-04-01 ENCOUNTER — APPOINTMENT (OUTPATIENT)
Dept: FAMILY MEDICINE | Facility: CLINIC | Age: 50
End: 2025-04-01
Payer: COMMERCIAL

## 2025-04-01 ENCOUNTER — OUTPATIENT (OUTPATIENT)
Dept: OUTPATIENT SERVICES | Facility: HOSPITAL | Age: 50
LOS: 1 days | End: 2025-04-01
Payer: COMMERCIAL

## 2025-04-01 VITALS
RESPIRATION RATE: 15 BRPM | WEIGHT: 134 LBS | HEART RATE: 81 BPM | DIASTOLIC BLOOD PRESSURE: 76 MMHG | SYSTOLIC BLOOD PRESSURE: 124 MMHG | OXYGEN SATURATION: 97 % | BODY MASS INDEX: 23.74 KG/M2 | TEMPERATURE: 97.7 F | HEIGHT: 63 IN

## 2025-04-01 DIAGNOSIS — Z94.0 KIDNEY TRANSPLANT STATUS: ICD-10-CM

## 2025-04-01 DIAGNOSIS — E03.9 HYPOTHYROIDISM, UNSPECIFIED: ICD-10-CM

## 2025-04-01 DIAGNOSIS — Z02.89 ENCOUNTER FOR OTHER ADMINISTRATIVE EXAMINATIONS: ICD-10-CM

## 2025-04-01 DIAGNOSIS — N18.2 CHRONIC KIDNEY DISEASE, STAGE 2 (MILD): ICD-10-CM

## 2025-04-01 DIAGNOSIS — N05.1 UNSPECIFIED NEPHRITIC SYNDROME WITH FOCAL AND SEGMENTAL GLOMERULAR LESIONS: ICD-10-CM

## 2025-04-01 DIAGNOSIS — E78.5 HYPERLIPIDEMIA, UNSPECIFIED: ICD-10-CM

## 2025-04-01 DIAGNOSIS — Z00.8 ENCOUNTER FOR OTHER GENERAL EXAMINATION: ICD-10-CM

## 2025-04-01 DIAGNOSIS — K21.9 GASTRO-ESOPHAGEAL REFLUX DISEASE W/OUT ESOPHAGITIS: ICD-10-CM

## 2025-04-01 DIAGNOSIS — E21.3 HYPERPARATHYROIDISM, UNSPECIFIED: ICD-10-CM

## 2025-04-01 DIAGNOSIS — I10 ESSENTIAL (PRIMARY) HYPERTENSION: ICD-10-CM

## 2025-04-01 PROCEDURE — 00002S: CUSTOM

## 2025-04-01 PROCEDURE — 00009S: CUSTOM

## 2025-04-01 PROCEDURE — 00012S: CUSTOM

## 2025-04-01 PROCEDURE — 00018S: CUSTOM

## 2025-04-01 PROCEDURE — 75574 CT ANGIO HRT W/3D IMAGE: CPT | Mod: 26

## 2025-04-01 PROCEDURE — 75574 CT ANGIO HRT W/3D IMAGE: CPT

## 2025-04-01 PROCEDURE — 00011S: CUSTOM

## 2025-08-29 ENCOUNTER — APPOINTMENT (OUTPATIENT)
Dept: NEPHROLOGY | Facility: CLINIC | Age: 50
End: 2025-08-29
Payer: COMMERCIAL

## 2025-08-29 VITALS — DIASTOLIC BLOOD PRESSURE: 86 MMHG | SYSTOLIC BLOOD PRESSURE: 126 MMHG

## 2025-08-29 VITALS
WEIGHT: 132 LBS | OXYGEN SATURATION: 96 % | HEIGHT: 63 IN | BODY MASS INDEX: 23.39 KG/M2 | HEART RATE: 92 BPM | TEMPERATURE: 98.3 F | SYSTOLIC BLOOD PRESSURE: 136 MMHG | DIASTOLIC BLOOD PRESSURE: 95 MMHG

## 2025-08-29 DIAGNOSIS — N05.1 UNSPECIFIED NEPHRITIC SYNDROME WITH FOCAL AND SEGMENTAL GLOMERULAR LESIONS: ICD-10-CM

## 2025-08-29 DIAGNOSIS — D84.9 IMMUNODEFICIENCY, UNSPECIFIED: ICD-10-CM

## 2025-08-29 DIAGNOSIS — Z94.0 KIDNEY TRANSPLANT STATUS: ICD-10-CM

## 2025-08-29 DIAGNOSIS — E21.3 HYPERPARATHYROIDISM, UNSPECIFIED: ICD-10-CM

## 2025-08-29 DIAGNOSIS — Z86.79 PERSONAL HISTORY OF OTHER DISEASES OF THE CIRCULATORY SYSTEM: ICD-10-CM

## 2025-08-29 LAB
ALBUMIN SERPL ELPH-MCNC: 4.8 G/DL
ANION GAP SERPL CALC-SCNC: 16 MMOL/L
BASOPHILS # BLD AUTO: 0.04 K/UL
BASOPHILS NFR BLD AUTO: 0.6 %
BUN SERPL-MCNC: 17 MG/DL
CALCIUM SERPL-MCNC: 10.6 MG/DL
CALCIUM SERPL-MCNC: 10.8 MG/DL
CHLORIDE SERPL-SCNC: 103 MMOL/L
CO2 SERPL-SCNC: 25 MMOL/L
CREAT SERPL-MCNC: 0.95 MG/DL
CREAT SPEC-SCNC: 39 MG/DL
CREAT/PROT UR: 0.3 RATIO
EGFRCR SERPLBLD CKD-EPI 2021: 99 ML/MIN/1.73M2
EOSINOPHIL # BLD AUTO: 0.11 K/UL
EOSINOPHIL NFR BLD AUTO: 1.7 %
ESTIMATED AVERAGE GLUCOSE: 114 MG/DL
GLUCOSE SERPL-MCNC: 60 MG/DL
HBA1C MFR BLD HPLC: 5.6 %
HCT VFR BLD CALC: 51.2 %
HGB BLD-MCNC: 15.9 G/DL
IMM GRANULOCYTES NFR BLD AUTO: 0.6 %
LYMPHOCYTES # BLD AUTO: 0.54 K/UL
LYMPHOCYTES NFR BLD AUTO: 8.4 %
MAN DIFF?: NORMAL
MCHC RBC-ENTMCNC: 30.1 PG
MCHC RBC-ENTMCNC: 31.1 G/DL
MCV RBC AUTO: 96.8 FL
MONOCYTES # BLD AUTO: 0.42 K/UL
MONOCYTES NFR BLD AUTO: 6.5 %
NEUTROPHILS # BLD AUTO: 5.29 K/UL
NEUTROPHILS NFR BLD AUTO: 82.2 %
PARATHYROID HORMONE INTACT: 150 PG/ML
PHOSPHATE SERPL-MCNC: 1.4 MG/DL
PLATELET # BLD AUTO: 195 K/UL
POTASSIUM SERPL-SCNC: 4 MMOL/L
PROT UR-MCNC: 10 MG/DL
RBC # BLD: 5.29 M/UL
RBC # FLD: 13 %
SODIUM SERPL-SCNC: 144 MMOL/L
TACROLIMUS SERPL-MCNC: 5.4 NG/ML
WBC # FLD AUTO: 6.44 K/UL

## 2025-08-29 PROCEDURE — G2211 COMPLEX E/M VISIT ADD ON: CPT | Mod: GC

## 2025-08-29 PROCEDURE — 99214 OFFICE O/P EST MOD 30 MIN: CPT | Mod: GC

## 2025-08-30 LAB
APPEARANCE: CLEAR
BACTERIA: NEGATIVE /HPF
BILIRUBIN URINE: NEGATIVE
BLOOD URINE: NEGATIVE
CAST: 0 /LPF
COLOR: YELLOW
EPITHELIAL CELLS: 0 /HPF
GLUCOSE QUALITATIVE U: NEGATIVE MG/DL
KETONES URINE: NEGATIVE MG/DL
LEUKOCYTE ESTERASE URINE: NEGATIVE
MICROSCOPIC-UA: NORMAL
NITRITE URINE: NEGATIVE
PH URINE: 6.5
PROTEIN URINE: NEGATIVE MG/DL
RED BLOOD CELLS URINE: 0 /HPF
SPECIFIC GRAVITY URINE: 1.01
UROBILINOGEN URINE: 0.2 MG/DL
WHITE BLOOD CELLS URINE: 0 /HPF